# Patient Record
Sex: FEMALE | Race: BLACK OR AFRICAN AMERICAN | NOT HISPANIC OR LATINO | Employment: UNEMPLOYED | URBAN - METROPOLITAN AREA
[De-identification: names, ages, dates, MRNs, and addresses within clinical notes are randomized per-mention and may not be internally consistent; named-entity substitution may affect disease eponyms.]

---

## 2018-03-02 ENCOUNTER — TELEPHONE (OUTPATIENT)
Dept: OBSTETRICS AND GYNECOLOGY | Facility: CLINIC | Age: 38
End: 2018-03-02

## 2018-03-02 NOTE — TELEPHONE ENCOUNTER
Jes-    Can you ask her to come in Friday the 9th @ 9:45.  Im going to put her in so no one takes the spot.    Cherelle

## 2018-03-02 NOTE — TELEPHONE ENCOUNTER
Cherelle,     I am fine to see her and try.      Thanks   Dr. Hawkins    ----- Message from Jes Summers sent at 3/1/2018  3:03 PM EST -----  Contact: Doctor's office  Dr. Shah's office called to see if we could work this patient in sometime soon because she has an IUD that he could not remove. He doesn't operate anymore and he thinks she'll need D&C. I have her records being sent over. Are you able to see her and evaluate this? They requested sometime within the next two weeks?

## 2018-03-09 ENCOUNTER — OFFICE VISIT (OUTPATIENT)
Dept: OBSTETRICS AND GYNECOLOGY | Facility: CLINIC | Age: 38
End: 2018-03-09

## 2018-03-09 VITALS
DIASTOLIC BLOOD PRESSURE: 82 MMHG | HEART RATE: 68 BPM | BODY MASS INDEX: 37.36 KG/M2 | HEIGHT: 62 IN | SYSTOLIC BLOOD PRESSURE: 125 MMHG | WEIGHT: 203 LBS

## 2018-03-09 DIAGNOSIS — R10.2 PELVIC PAIN: ICD-10-CM

## 2018-03-09 DIAGNOSIS — T83.32XA DISPLACEMENT OF INTRAUTERINE CONTRACEPTIVE DEVICE, INITIAL ENCOUNTER: Primary | ICD-10-CM

## 2018-03-09 PROCEDURE — 99204 OFFICE O/P NEW MOD 45 MIN: CPT | Performed by: OBSTETRICS & GYNECOLOGY

## 2018-03-14 ENCOUNTER — APPOINTMENT (OUTPATIENT)
Dept: PREADMISSION TESTING | Facility: HOSPITAL | Age: 38
End: 2018-03-14

## 2018-03-14 VITALS
HEIGHT: 62 IN | RESPIRATION RATE: 18 BRPM | TEMPERATURE: 97.1 F | DIASTOLIC BLOOD PRESSURE: 78 MMHG | WEIGHT: 204 LBS | HEART RATE: 74 BPM | SYSTOLIC BLOOD PRESSURE: 113 MMHG | BODY MASS INDEX: 37.54 KG/M2 | OXYGEN SATURATION: 98 %

## 2018-03-14 LAB
DEPRECATED RDW RBC AUTO: 42.9 FL (ref 37–54)
ERYTHROCYTE [DISTWIDTH] IN BLOOD BY AUTOMATED COUNT: 12.4 % (ref 11.7–13)
HCG SERPL QL: NEGATIVE
HCT VFR BLD AUTO: 39.5 % (ref 35.6–45.5)
HGB BLD-MCNC: 12.3 G/DL (ref 11.9–15.5)
MCH RBC QN AUTO: 29.6 PG (ref 26.9–32)
MCHC RBC AUTO-ENTMCNC: 31.1 G/DL (ref 32.4–36.3)
MCV RBC AUTO: 95.2 FL (ref 80.5–98.2)
PLATELET # BLD AUTO: 420 10*3/MM3 (ref 140–500)
PMV BLD AUTO: 9.1 FL (ref 6–12)
RBC # BLD AUTO: 4.15 10*6/MM3 (ref 3.9–5.2)
WBC NRBC COR # BLD: 6.59 10*3/MM3 (ref 4.5–10.7)

## 2018-03-14 PROCEDURE — 36415 COLL VENOUS BLD VENIPUNCTURE: CPT

## 2018-03-14 PROCEDURE — 93010 ELECTROCARDIOGRAM REPORT: CPT | Performed by: INTERNAL MEDICINE

## 2018-03-14 PROCEDURE — 85027 COMPLETE CBC AUTOMATED: CPT | Performed by: OBSTETRICS & GYNECOLOGY

## 2018-03-14 PROCEDURE — 93005 ELECTROCARDIOGRAM TRACING: CPT

## 2018-03-14 PROCEDURE — 84703 CHORIONIC GONADOTROPIN ASSAY: CPT | Performed by: OBSTETRICS & GYNECOLOGY

## 2018-03-14 NOTE — H&P
Patient Care Team:  No Known Provider as PCP - General    Chief complaint bleeding and pain     Subjective     History of Present Illness     37 y.o. female sent to me by local gynecologist no longer operating. Presented wanting IUD removed due to pain and bleeding issues, attempted times two in office to remove, not successful and was painful. Presents wanting removed still, plan to try hysteroscopically     Review of Systems   Constitutional: Negative for chills and fever.   Gastrointestinal: Negative for abdominal pain, nausea and vomiting.   Genitourinary: Positive for pelvic pain and vaginal bleeding. Negative for vaginal discharge.   All other systems reviewed and are negative.       Past Medical History:   Diagnosis Date   • Migraine      Past Surgical History:   Procedure Laterality Date   • WISDOM TOOTH EXTRACTION       Family History   Problem Relation Age of Onset   • Breast cancer Neg Hx    • Ovarian cancer Neg Hx    • Uterine cancer Neg Hx    • Colon cancer Neg Hx    • Deep vein thrombosis Neg Hx    • Pulmonary embolism Neg Hx      Social History   Substance Use Topics   • Smoking status: Never Smoker   • Smokeless tobacco: Never Used   • Alcohol use Yes     No prescriptions prior to admission.     Allergies:  Review of patient's allergies indicates no known allergies.    Objective      Vital Signs       Physical Exam   Constitutional: She is oriented to person, place, and time. She appears well-developed and well-nourished.   HENT:   Head: Normocephalic and atraumatic.   Eyes: Conjunctivae are normal.   Neck: Normal range of motion. Neck supple. No thyromegaly present.   Cardiovascular: Normal rate, regular rhythm and normal heart sounds.    No murmur heard.  Pulmonary/Chest: Effort normal and breath sounds normal.   Abdominal: Soft. Bowel sounds are normal.   Musculoskeletal: She exhibits no edema.   Neurological: She is alert and oriented to person, place, and time.   Skin: No rash noted.    Psychiatric: She has a normal mood and affect. Her behavior is normal.       Results Review:   Labs pending       Assessment/Plan     Principal Problem:    Displacement of intrauterine contraceptive device      Assessment & Plan     1) IUD causing pain and bleeding   Appears to be difficult to remove by prior GYN   Plan to assess removal via HSC and if necessary Laparoscopy. - discussed potential to not be able to remove intact.     R/B/A reviewed.   Voiced understanding   Desires to proceed.     I discussed the patients findings and my recommendations with patient    Iglesia Hawkins MD  03/14/18  12:53 PM

## 2018-03-14 NOTE — DISCHARGE INSTRUCTIONS
Take the following medications the morning of surgery with a small sip of water: NONE    ARRIVE AT 0530 AM    General Instructions:  • Do not eat solid food after midnight the night before surgery.  • You may drink clear liquids day of surgery but must stop at least one hour before your hospital arrival time.  • It is beneficial for you to have a clear drink that contains carbohydrates the day of surgery.  We suggest a 12 to 20 ounce bottle of Gatorade or Powerade for non-diabetic patients or a 12 to 20 ounce bottle of G2 or Powerade Zero for diabetic patients. (Pediatric patients, are not advised to drink a 12 to 20 ounce carbohydrate drink)    Clear liquids are liquids you can see through.  Nothing red in color.     Plain water                               Sports drinks  Sodas                                   Gelatin (Jell-O)  Fruit juices without pulp such as white grape juice and apple juice  Popsicles that contain no fruit or yogurt  Tea or coffee (no cream or milk added)  Gatorade / Powerade  G2 / Powerade Zero    • Infants may have breast milk up to four hours before surgery.  • Infants drinking formula may drink formula up to six hours before surgery.   • Patients who avoid smoking, chewing tobacco and alcohol for 4 weeks prior to surgery have a reduced risk of post-operative complications.  Quit smoking as many days before surgery as you can.  • Do not smoke, use chewing tobacco or drink alcohol the day of surgery.   • If applicable bring your C-PAP/ BI-PAP machine.  • Bring any papers given to you in the doctor’s office.  • Wear clean comfortable clothes and socks.  • Do not wear contact lenses or make-up.  Bring a case for your glasses.   • Bring crutches or walker if applicable.  • Remove all piercings.  Leave jewelry and any other valuables at home.  • Hair extensions with metal clips must be removed prior to surgery.  • The Pre-Admission Testing nurse will instruct you to bring medications if unable  to obtain an accurate list in Pre-Admission Testing.        Preventing a Surgical Site Infection:  • For 2 to 3 days before surgery, avoid shaving with a razor because the razor can irritate skin and make it easier to develop an infection.  • The night prior to surgery sleep in a clean bed with clean clothing.  Do not allow pets to sleep with you.  • Shower on the morning of surgery using a fresh bar of anti-bacterial soap (such as Dial) and clean washcloth.  Dry with a clean towel and dress in clean clothing.  • Ask your surgeon if you will be receiving antibiotics prior to surgery.  • Make sure you, your family, and all healthcare providers clean their hands with soap and water or an alcohol based hand  before caring for you or your wound.    Day of surgery:  Upon arrival, a Pre-op nurse and Anesthesiologist will review your health history, obtain vital signs, and answer questions you may have.  The only belongings needed at this time will be your home medications and if applicable your C-PAP/BI-PAP machine.  If you are staying overnight your family can leave the rest of your belongings in the car and bring them to your room later.  A Pre-op nurse will start an IV and you may receive medication in preparation for surgery, including something to help you relax.  Your family will be able to see you in the Pre-op area.  While you are in surgery your family should notify the waiting room  if they leave the waiting room area and provide a contact phone number.    Please be aware that surgery does come with discomfort.  We want to make every effort to control your discomfort so please discuss any uncontrolled symptoms with your nurse.   Your doctor will most likely have prescribed pain medications.      If you are going home after surgery you will receive individualized written care instructions before being discharged.  A responsible adult must drive you to and from the hospital on the day of your  surgery and stay with you for 24 hours.    If you are staying overnight following surgery, you will be transported to your hospital room following the recovery period.  [facility] has all private rooms.    If you have any questions please call Pre-Admission Testing at [phone].  Deductibles and co-payments are collected on the day of service. Please be prepared to pay the required co-pay, deductible or deposit on the day of service as defined by your plan.

## 2018-03-15 ENCOUNTER — ANESTHESIA EVENT (OUTPATIENT)
Dept: PERIOP | Facility: HOSPITAL | Age: 38
End: 2018-03-15

## 2018-03-15 ENCOUNTER — HOSPITAL ENCOUNTER (OUTPATIENT)
Facility: HOSPITAL | Age: 38
Setting detail: HOSPITAL OUTPATIENT SURGERY
Discharge: HOME | End: 2018-03-15
Attending: OBSTETRICS & GYNECOLOGY | Admitting: OBSTETRICS & GYNECOLOGY

## 2018-03-15 ENCOUNTER — ANESTHESIA (OUTPATIENT)
Dept: PERIOP | Facility: HOSPITAL | Age: 38
End: 2018-03-15

## 2018-03-15 VITALS
WEIGHT: 205.03 LBS | SYSTOLIC BLOOD PRESSURE: 115 MMHG | TEMPERATURE: 98 F | BODY MASS INDEX: 37.73 KG/M2 | OXYGEN SATURATION: 100 % | DIASTOLIC BLOOD PRESSURE: 68 MMHG | HEIGHT: 62 IN | RESPIRATION RATE: 16 BRPM | HEART RATE: 81 BPM

## 2018-03-15 DIAGNOSIS — T83.32XA DISPLACEMENT OF INTRAUTERINE CONTRACEPTIVE DEVICE, INITIAL ENCOUNTER: Primary | ICD-10-CM

## 2018-03-15 PROCEDURE — 25010000002 FENTANYL CITRATE (PF) 100 MCG/2ML SOLUTION: Performed by: NURSE ANESTHETIST, CERTIFIED REGISTERED

## 2018-03-15 PROCEDURE — 25010000002 ONDANSETRON PER 1 MG: Performed by: NURSE ANESTHETIST, CERTIFIED REGISTERED

## 2018-03-15 PROCEDURE — 25010000002 DEXAMETHASONE PER 1 MG: Performed by: NURSE ANESTHETIST, CERTIFIED REGISTERED

## 2018-03-15 PROCEDURE — 25010000002 SUCCINYLCHOLINE PER 20 MG: Performed by: NURSE ANESTHETIST, CERTIFIED REGISTERED

## 2018-03-15 PROCEDURE — 25010000002 PROPOFOL 10 MG/ML EMULSION: Performed by: NURSE ANESTHETIST, CERTIFIED REGISTERED

## 2018-03-15 PROCEDURE — 58562 HYSTEROSCOPY REMOVE FB: CPT | Performed by: OBSTETRICS & GYNECOLOGY

## 2018-03-15 PROCEDURE — 25010000002 MIDAZOLAM PER 1 MG: Performed by: ANESTHESIOLOGY

## 2018-03-15 RX ORDER — PROMETHAZINE HYDROCHLORIDE 25 MG/1
25 TABLET ORAL ONCE AS NEEDED
Status: DISCONTINUED | OUTPATIENT
Start: 2018-03-15 | End: 2018-03-15 | Stop reason: HOSPADM

## 2018-03-15 RX ORDER — MIDAZOLAM HYDROCHLORIDE 1 MG/ML
1 INJECTION INTRAMUSCULAR; INTRAVENOUS
Status: DISCONTINUED | OUTPATIENT
Start: 2018-03-15 | End: 2018-03-15 | Stop reason: HOSPADM

## 2018-03-15 RX ORDER — HYDROMORPHONE HCL 110MG/55ML
0.5 PATIENT CONTROLLED ANALGESIA SYRINGE INTRAVENOUS
Status: DISCONTINUED | OUTPATIENT
Start: 2018-03-15 | End: 2018-03-15 | Stop reason: HOSPADM

## 2018-03-15 RX ORDER — DIPHENHYDRAMINE HYDROCHLORIDE 50 MG/ML
12.5 INJECTION INTRAMUSCULAR; INTRAVENOUS
Status: DISCONTINUED | OUTPATIENT
Start: 2018-03-15 | End: 2018-03-15 | Stop reason: HOSPADM

## 2018-03-15 RX ORDER — LIDOCAINE HYDROCHLORIDE 20 MG/ML
INJECTION, SOLUTION INFILTRATION; PERINEURAL AS NEEDED
Status: DISCONTINUED | OUTPATIENT
Start: 2018-03-15 | End: 2018-03-15 | Stop reason: SURG

## 2018-03-15 RX ORDER — FLUMAZENIL 0.1 MG/ML
0.2 INJECTION INTRAVENOUS AS NEEDED
Status: DISCONTINUED | OUTPATIENT
Start: 2018-03-15 | End: 2018-03-15 | Stop reason: HOSPADM

## 2018-03-15 RX ORDER — FENTANYL CITRATE 50 UG/ML
50 INJECTION, SOLUTION INTRAMUSCULAR; INTRAVENOUS
Status: DISCONTINUED | OUTPATIENT
Start: 2018-03-15 | End: 2018-03-15 | Stop reason: HOSPADM

## 2018-03-15 RX ORDER — SODIUM CHLORIDE 0.9 % (FLUSH) 0.9 %
1-10 SYRINGE (ML) INJECTION AS NEEDED
Status: DISCONTINUED | OUTPATIENT
Start: 2018-03-15 | End: 2018-03-15 | Stop reason: HOSPADM

## 2018-03-15 RX ORDER — LIDOCAINE HYDROCHLORIDE 10 MG/ML
0.5 INJECTION, SOLUTION EPIDURAL; INFILTRATION; INTRACAUDAL; PERINEURAL ONCE AS NEEDED
Status: DISCONTINUED | OUTPATIENT
Start: 2018-03-15 | End: 2018-03-15 | Stop reason: HOSPADM

## 2018-03-15 RX ORDER — LABETALOL HYDROCHLORIDE 5 MG/ML
5 INJECTION, SOLUTION INTRAVENOUS
Status: DISCONTINUED | OUTPATIENT
Start: 2018-03-15 | End: 2018-03-15 | Stop reason: HOSPADM

## 2018-03-15 RX ORDER — HYDRALAZINE HYDROCHLORIDE 20 MG/ML
5 INJECTION INTRAMUSCULAR; INTRAVENOUS
Status: DISCONTINUED | OUTPATIENT
Start: 2018-03-15 | End: 2018-03-15 | Stop reason: HOSPADM

## 2018-03-15 RX ORDER — HYDROCODONE BITARTRATE AND ACETAMINOPHEN 5; 325 MG/1; MG/1
1 TABLET ORAL EVERY 6 HOURS PRN
Qty: 10 TABLET | Refills: 0 | Status: SHIPPED | OUTPATIENT
Start: 2018-03-15 | End: 2018-03-29

## 2018-03-15 RX ORDER — DEXAMETHASONE SODIUM PHOSPHATE 10 MG/ML
INJECTION INTRAMUSCULAR; INTRAVENOUS AS NEEDED
Status: DISCONTINUED | OUTPATIENT
Start: 2018-03-15 | End: 2018-03-15 | Stop reason: SURG

## 2018-03-15 RX ORDER — EPHEDRINE SULFATE 50 MG/ML
5 INJECTION, SOLUTION INTRAVENOUS ONCE AS NEEDED
Status: DISCONTINUED | OUTPATIENT
Start: 2018-03-15 | End: 2018-03-15 | Stop reason: HOSPADM

## 2018-03-15 RX ORDER — MAGNESIUM HYDROXIDE 1200 MG/15ML
LIQUID ORAL AS NEEDED
Status: DISCONTINUED | OUTPATIENT
Start: 2018-03-15 | End: 2018-03-15 | Stop reason: HOSPADM

## 2018-03-15 RX ORDER — PROMETHAZINE HYDROCHLORIDE 25 MG/1
25 SUPPOSITORY RECTAL ONCE AS NEEDED
Status: DISCONTINUED | OUTPATIENT
Start: 2018-03-15 | End: 2018-03-15 | Stop reason: HOSPADM

## 2018-03-15 RX ORDER — SCOLOPAMINE TRANSDERMAL SYSTEM 1 MG/1
1 PATCH, EXTENDED RELEASE TRANSDERMAL
Status: DISCONTINUED | OUTPATIENT
Start: 2018-03-15 | End: 2018-03-15 | Stop reason: HOSPADM

## 2018-03-15 RX ORDER — FENTANYL CITRATE 50 UG/ML
INJECTION, SOLUTION INTRAMUSCULAR; INTRAVENOUS AS NEEDED
Status: DISCONTINUED | OUTPATIENT
Start: 2018-03-15 | End: 2018-03-15 | Stop reason: SURG

## 2018-03-15 RX ORDER — ROCURONIUM BROMIDE 10 MG/ML
INJECTION, SOLUTION INTRAVENOUS AS NEEDED
Status: DISCONTINUED | OUTPATIENT
Start: 2018-03-15 | End: 2018-03-15 | Stop reason: SURG

## 2018-03-15 RX ORDER — PROPOFOL 10 MG/ML
VIAL (ML) INTRAVENOUS AS NEEDED
Status: DISCONTINUED | OUTPATIENT
Start: 2018-03-15 | End: 2018-03-15 | Stop reason: SURG

## 2018-03-15 RX ORDER — ONDANSETRON 2 MG/ML
4 INJECTION INTRAMUSCULAR; INTRAVENOUS ONCE AS NEEDED
Status: DISCONTINUED | OUTPATIENT
Start: 2018-03-15 | End: 2018-03-15 | Stop reason: HOSPADM

## 2018-03-15 RX ORDER — ONDANSETRON 2 MG/ML
INJECTION INTRAMUSCULAR; INTRAVENOUS AS NEEDED
Status: DISCONTINUED | OUTPATIENT
Start: 2018-03-15 | End: 2018-03-15 | Stop reason: SURG

## 2018-03-15 RX ORDER — SODIUM CHLORIDE, SODIUM LACTATE, POTASSIUM CHLORIDE, CALCIUM CHLORIDE 600; 310; 30; 20 MG/100ML; MG/100ML; MG/100ML; MG/100ML
9 INJECTION, SOLUTION INTRAVENOUS CONTINUOUS
Status: DISCONTINUED | OUTPATIENT
Start: 2018-03-15 | End: 2018-03-15 | Stop reason: HOSPADM

## 2018-03-15 RX ORDER — FAMOTIDINE 10 MG/ML
20 INJECTION, SOLUTION INTRAVENOUS ONCE
Status: COMPLETED | OUTPATIENT
Start: 2018-03-15 | End: 2018-03-15

## 2018-03-15 RX ORDER — PROMETHAZINE HYDROCHLORIDE 25 MG/1
12.5 TABLET ORAL ONCE AS NEEDED
Status: DISCONTINUED | OUTPATIENT
Start: 2018-03-15 | End: 2018-03-15 | Stop reason: HOSPADM

## 2018-03-15 RX ORDER — HYDROCODONE BITARTRATE AND ACETAMINOPHEN 7.5; 325 MG/1; MG/1
1 TABLET ORAL ONCE AS NEEDED
Status: DISCONTINUED | OUTPATIENT
Start: 2018-03-15 | End: 2018-03-15 | Stop reason: HOSPADM

## 2018-03-15 RX ORDER — OXYCODONE AND ACETAMINOPHEN 7.5; 325 MG/1; MG/1
1 TABLET ORAL ONCE AS NEEDED
Status: DISCONTINUED | OUTPATIENT
Start: 2018-03-15 | End: 2018-03-15 | Stop reason: HOSPADM

## 2018-03-15 RX ORDER — NALOXONE HCL 0.4 MG/ML
0.2 VIAL (ML) INJECTION AS NEEDED
Status: DISCONTINUED | OUTPATIENT
Start: 2018-03-15 | End: 2018-03-15 | Stop reason: HOSPADM

## 2018-03-15 RX ORDER — PROMETHAZINE HYDROCHLORIDE 25 MG/ML
12.5 INJECTION, SOLUTION INTRAMUSCULAR; INTRAVENOUS ONCE AS NEEDED
Status: DISCONTINUED | OUTPATIENT
Start: 2018-03-15 | End: 2018-03-15 | Stop reason: HOSPADM

## 2018-03-15 RX ORDER — MIDAZOLAM HYDROCHLORIDE 1 MG/ML
2 INJECTION INTRAMUSCULAR; INTRAVENOUS
Status: DISCONTINUED | OUTPATIENT
Start: 2018-03-15 | End: 2018-03-15 | Stop reason: HOSPADM

## 2018-03-15 RX ORDER — SUCCINYLCHOLINE CHLORIDE 20 MG/ML
INJECTION INTRAMUSCULAR; INTRAVENOUS AS NEEDED
Status: DISCONTINUED | OUTPATIENT
Start: 2018-03-15 | End: 2018-03-15 | Stop reason: SURG

## 2018-03-15 RX ADMIN — SUCCINYLCHOLINE CHLORIDE 120 MG: 20 INJECTION, SOLUTION INTRAMUSCULAR; INTRAVENOUS; PARENTERAL at 07:36

## 2018-03-15 RX ADMIN — DEXAMETHASONE SODIUM PHOSPHATE 8 MG: 10 INJECTION INTRAMUSCULAR; INTRAVENOUS at 07:43

## 2018-03-15 RX ADMIN — PROPOFOL 200 MG: 10 INJECTION, EMULSION INTRAVENOUS at 07:35

## 2018-03-15 RX ADMIN — MIDAZOLAM 1 MG: 1 INJECTION INTRAMUSCULAR; INTRAVENOUS at 06:45

## 2018-03-15 RX ADMIN — SCOPALAMINE 1 PATCH: 1 PATCH, EXTENDED RELEASE TRANSDERMAL at 06:45

## 2018-03-15 RX ADMIN — SODIUM CHLORIDE, POTASSIUM CHLORIDE, SODIUM LACTATE AND CALCIUM CHLORIDE: 600; 310; 30; 20 INJECTION, SOLUTION INTRAVENOUS at 07:29

## 2018-03-15 RX ADMIN — ROCURONIUM BROMIDE 5 MG: 10 INJECTION INTRAVENOUS at 07:35

## 2018-03-15 RX ADMIN — ONDANSETRON 4 MG: 2 INJECTION INTRAMUSCULAR; INTRAVENOUS at 07:43

## 2018-03-15 RX ADMIN — FENTANYL CITRATE 100 MCG: 50 INJECTION INTRAMUSCULAR; INTRAVENOUS at 07:32

## 2018-03-15 RX ADMIN — LIDOCAINE HYDROCHLORIDE 100 MG: 20 INJECTION, SOLUTION INFILTRATION; PERINEURAL at 07:35

## 2018-03-15 RX ADMIN — FAMOTIDINE 20 MG: 10 INJECTION INTRAVENOUS at 06:45

## 2018-03-15 NOTE — OP NOTE
OP Note    Hysteroscopic Removal of IUD      Date of Service:  03/15/18  Time of Service:  8:18 AM    Surgical Staff: Surgeon(s) and Role:     * Iglesia Hawkins MD - Primary   Additional Staff: None      Pre-operative diagnosis(es):   Pre-Op Diagnosis Codes:     * Displacement of intrauterine contraceptive device, initial encounter [T83.32XA]     Post-operative diagnosis(es):   Post-Op Diagnosis Codes:     * Displacement of intrauterine contraceptive device, initial encounter [T83.32XA]   Procedure(s): Procedure(s):  Hysteroscopy with removal of IUD           Anesthesia: Type: General  ASA:  II          Operative findings: IUD in upper aspect of the cervix, lower uterine segment.    Specimens removed: None               Drains:  None       Complications: None       Condition: stable   Disposition: to PACU and then home            Patient was taken to operating room.  She was placed under general anesthesia with LMA and placed in the dorsal supine lithotomy position in [facility].  Time out was performed and she was prepped and draped in normal sterile fashion with in and out catheterization of her bladder.     I then placed a weighted speculum in her vagina and grasped the anterior cervix. Using the graduated dilators, I opened the cervix to 16 Romansh and then placed the hysteroscope into the cavity.  I was able quickly to identify her IUD and it appeared intact and low in the uterus/upper cervix.  Trial to blindly grab with uterine packing forceps was unsuccessful. So I placed a grasper through the hysteroscope and was able to bring the top of the IUD out and then remove without trouble.      The IUD was inspected and intact minus the string, which had been reported by prior GYN as broken.      The tenaculum was removed and significant bleeding noted along the anterior cervix.  To get hemostasis, I placed two 3-0 chromic figure of eight sutures and then coated that with Monsel's.      Hemostasis was noted  at that point.     The patient tolerated the procedure well, is on her way to the recovery room and will make be discharged later today with follow up in my office.              Iglesia Hawkins MD  03/15/18  8:17 AM

## 2018-03-15 NOTE — ANESTHESIA POSTPROCEDURE EVALUATION
Patient: April DARIEN Gooden    Procedure Summary     Date:  03/15/18 Room / Location:  [facility] OR  / [facility] MAIN OR    Anesthesia Start:  0729 Anesthesia Stop:  0826    Procedure:  Hysteroscopy with removal of IUD (N/A Uterus) Diagnosis:       Displacement of intrauterine contraceptive device, initial encounter      (Displacement of intrauterine contraceptive device, initial encounter [T83.32XA])    Surgeon:  Iglesia Hawkins MD Provider:  Omega Bernard MD    Anesthesia Type:  general ASA Status:  2          Anesthesia Type: general  Last vitals  BP   129/89 (03/15/18 0845)   Temp   36.8 °C (98.2 °F) (03/15/18 0822)   Pulse   63 (03/15/18 0845)   Resp   16 (03/15/18 0845)     SpO2   100 % (03/15/18 0845)     Post Anesthesia Care and Evaluation    Patient location during evaluation: PACU  Patient participation: complete - patient participated  Level of consciousness: awake and alert  Pain management: adequate  Airway patency: patent  Anesthetic complications: No anesthetic complications    Cardiovascular status: acceptable  Respiratory status: acceptable  Hydration status: acceptable    Comments: --------------------            03/15/18               0845     --------------------   BP:       129/89     Pulse:      63       Resp:       16       Temp:                SpO2:      100%     --------------------

## 2018-03-15 NOTE — ANESTHESIA PREPROCEDURE EVALUATION
Anesthesia Evaluation     Patient summary reviewed and Nursing notes reviewed                Airway   Mallampati: II  No difficulty expected  Dental      Pulmonary - negative pulmonary ROS   Cardiovascular - negative cardio ROS    ECG reviewed  Rhythm: regular  Rate: normal        Neuro/Psych  (+) headaches, psychiatric history Anxiety and Depression,     GI/Hepatic/Renal/Endo - negative ROS     Musculoskeletal (-) negative ROS    Abdominal    Substance History - negative use     OB/GYN negative ob/gyn ROS         Other                        Anesthesia Plan    ASA 2     general     intravenous induction   Anesthetic plan and risks discussed with patient.

## 2018-03-15 NOTE — INTERVAL H&P NOTE
H&P reviewed. The patient was examined and there are no changes to the H&P.     Iglesia Hawkins MD  3/15/2018  7:24 AM

## 2018-03-15 NOTE — DISCHARGE INSTRUCTIONS
**Pelvic rest for 2 weeks**     HOW DO I REST MY PELVIS?  For as long as told by your health care provider:  · Do not have sex, sexual stimulation, or an orgasm.  · Do not use tampons. Do not douche. Do not put anything in your vagina.  · Avoid activities that take a lot of effort (are strenuous).  · Avoid any activity in which your pelvic muscles could become strained.      Scopolamine Patch  This patch has been applied to the skin behind one of your ears.  It may stay in place up to 24 hours. You may remove it at any time after your surgery; however, it should be removed after you are up and walking around the next day.  This medicine reduces stomach upset. Side effects may include: dry mouth, dizziness, sleepiness, constipation, or upset stomach.  An allergy would show up as: a rash, itching, wheezing or shortness of breath.  Follow these instructions:  1. Do not drink alcohol, drive or operate machinery while taking this medicine.  2. Wear only 1 patch at a time. You can leave the patch on for up to 24 hours.  3. When you remove the patch, fold it in half with the sticky sides together and throw it away. Wash your hands and the area under the patch.  4. Do not touch your eye with your hand if it has touched the patch.  5. Wash your hands well before and after touching the patch.  6. Sit or stand slowly to avoid dizziness.  Call your doctor if you have:  1. Any sign of allergy  2. No relief  3. Trouble passing urine  4. Any new or severe symptoms      **Please remove the patch behind your ear tonight or tomorrow.**

## 2018-03-15 NOTE — ANESTHESIA PROCEDURE NOTES
Airway  Urgency: elective    Date/Time: 3/15/2018 7:38 AM  Airway not difficult    General Information and Staff    Patient location during procedure: OR  Anesthesiologist: RONALD SHRESTHA  CRNA: AIDA TRIMBLE    Indications and Patient Condition  Indications for airway management: airway protection    Preoxygenated: yes  MILS maintained throughout  Mask difficulty assessment: 1 - vent by mask    Final Airway Details  Final airway type: endotracheal airway      Successful airway: ETT  Cuffed: yes   Successful intubation technique: direct laryngoscopy  Facilitating devices/methods: intubating stylet  Endotracheal tube insertion site: oral  Blade: Stormy  Blade size: #3  ETT size: 7.0 mm  Cormack-Lehane Classification: grade I - full view of glottis  Placement verified by: chest auscultation and capnometry   Measured from: lips  ETT to lips (cm): 22  Number of attempts at approach: 1    Additional Comments  Teeth checked, no damage

## 2018-03-29 ENCOUNTER — OFFICE VISIT (OUTPATIENT)
Dept: OBSTETRICS AND GYNECOLOGY | Facility: CLINIC | Age: 38
End: 2018-03-29

## 2018-03-29 VITALS
SYSTOLIC BLOOD PRESSURE: 132 MMHG | BODY MASS INDEX: 37.91 KG/M2 | HEIGHT: 62 IN | WEIGHT: 206 LBS | DIASTOLIC BLOOD PRESSURE: 84 MMHG | HEART RATE: 79 BPM

## 2018-03-29 DIAGNOSIS — Z98.890 POST-OPERATIVE STATE: Primary | ICD-10-CM

## 2018-03-29 PROCEDURE — 99212 OFFICE O/P EST SF 10 MIN: CPT | Performed by: OBSTETRICS & GYNECOLOGY

## 2018-03-29 NOTE — PROGRESS NOTES
"Subjective   April DARIEN Gooden is a 37 y.o. female here for Post op exam. Pt is s/p IUD removal on 03/15/2018. Pt without c/o's.    History of Present Illness     37 y.o.  @ 1 week post IUD removal.   No issues or concerns.     Review of Systems   Constitutional: Negative for chills and fever.   Gastrointestinal: Negative for abdominal pain, nausea and vomiting.   Genitourinary: Negative for pelvic pain, vaginal bleeding and vaginal discharge.       Objective    /84   Pulse 79   Ht 157.5 cm (62\")   Wt 93.4 kg (206 lb)   LMP 2018   Breastfeeding? No   BMI 37.68 kg/m²   Physical Exam   Constitutional: She appears well-developed and well-nourished. No distress.   Abdominal: Soft. Bowel sounds are normal. She exhibits no distension. There is no tenderness.   Genitourinary: Vagina normal and uterus normal. Pelvic exam was performed with patient supine. There is no lesion on the right labia. There is no lesion on the left labia. Uterus is not enlarged, not fixed and not tender. Cervix exhibits no motion tenderness. Right adnexum displays no mass and no tenderness. Left adnexum displays no mass and no tenderness. No bleeding in the vagina. No vaginal discharge found.   Lymphadenopathy:        Right: No inguinal adenopathy present.         Assessment/Plan   Problems Addressed this Visit     None      Visit Diagnoses     Post-operative state    -  Primary        S/P HSC removal of IUD   No apparent complications   Reviewed operative findings and ongoing expectations.   Follow up Dr. Shah as needed.     Iglesia Hawkins MD  3/29/2018  2:52 PM               "

## 2019-05-19 NOTE — PROGRESS NOTES
"Subjective   April Giacomo is a 37 y.o. female here for Mirena removal. Pt has had cramping for the past few months and desires to have this removed. Pt has been to her GYN, but he was unable to remove the IUD after 2 attempts.     History of Present Illness     37 y.o. - Seeing her normal GYN, Dr. Shah for IUD to be removed, was having trouble with pain this time. Was removing for pain, placed 3 years ago, approximately, pain worse since October, 2017.   Tried times two, once pulling out the strings and not able to remove.  Here to discuss trial of Hysteroscopic removal.   Her GYN no longer does operative cases and she is new to this practice/[facility].     Past Medical History:   Diagnosis Date   • Migraine      Past Surgical History:   Procedure Laterality Date   • WISDOM TOOTH EXTRACTION       Meds - Multivitamin     No Known Allergies    Social History   Substance Use Topics   • Smoking status: Never Smoker   • Smokeless tobacco: Never Used   • Alcohol use Yes     Family History   Problem Relation Age of Onset   • Breast cancer Neg Hx    • Ovarian cancer Neg Hx    • Uterine cancer Neg Hx    • Colon cancer Neg Hx    • Deep vein thrombosis Neg Hx    • Pulmonary embolism Neg Hx        Review of Systems   Constitutional: Negative for chills and fever.   Gastrointestinal: Negative for abdominal pain.   Genitourinary: Positive for pelvic pain. Negative for dysuria, menstrual problem, vaginal bleeding and vaginal discharge.   All other systems reviewed and are negative.      Objective    /82  Pulse 68  Ht 157.5 cm (62\")  Wt 92.1 kg (203 lb)  LMP 03/07/2018 (Exact Date)  Breastfeeding? No  BMI 37.13 kg/m2   Physical Exam   Constitutional: She is oriented to person, place, and time. She appears well-developed and well-nourished.   HENT:   Head: Normocephalic and atraumatic.   Eyes: Conjunctivae are normal.   Neck: Normal range of motion. Neck supple. No thyromegaly present.   Cardiovascular: Normal rate, " regular rhythm and normal heart sounds.    No murmur heard.  Pulmonary/Chest: Effort normal and breath sounds normal.   Abdominal: Soft. Bowel sounds are normal.   Genitourinary: Uterus normal. Pelvic exam was performed with patient supine. There is no lesion on the right labia. There is no lesion on the left labia. Uterus is not enlarged, not fixed and not tender. Cervix exhibits no motion tenderness (No strings seen ). Right adnexum displays no mass. Left adnexum displays no mass. There is bleeding in the vagina. No vaginal discharge found.   Musculoskeletal: She exhibits no edema.   Lymphadenopathy:        Right: No inguinal adenopathy present.        Left: No inguinal adenopathy present.   Neurological: She is alert and oriented to person, place, and time.   Skin: No rash noted.   Psychiatric: She has a normal mood and affect. Her behavior is normal.         Assessment/Plan   Problems Addressed this Visit     None      Visit Diagnoses     Displacement of intrauterine contraceptive device, initial encounter    -  Primary    Relevant Orders    Case Request    Pelvic pain            Has had attempted removal in office times two.   One of those causing her strings to come off.   Here to discuss options    - Given prior difficulty, plan to attempt removal with hysteroscopy, if concerned for perforation and in cavity, may require laparoscopy, but I doubt it.     R/B/A reviewed  Voiced understanding   Wishes to proceed as planned.     Iglesia Hawkins MD  3/9/2018  10:43 AM            no indicators present

## 2019-09-03 ENCOUNTER — PROCEDURE VISIT (OUTPATIENT)
Dept: OBSTETRICS AND GYNECOLOGY | Age: 39
End: 2019-09-03

## 2019-09-03 ENCOUNTER — OFFICE VISIT (OUTPATIENT)
Dept: OBSTETRICS AND GYNECOLOGY | Age: 39
End: 2019-09-03

## 2019-09-03 VITALS
WEIGHT: 205 LBS | BODY MASS INDEX: 37.73 KG/M2 | DIASTOLIC BLOOD PRESSURE: 80 MMHG | HEIGHT: 62 IN | SYSTOLIC BLOOD PRESSURE: 118 MMHG

## 2019-09-03 DIAGNOSIS — O36.80X0 ENCOUNTER TO DETERMINE FETAL VIABILITY OF PREGNANCY, SINGLE OR UNSPECIFIED FETUS: Primary | ICD-10-CM

## 2019-09-03 DIAGNOSIS — D50.0 IRON DEFICIENCY ANEMIA DUE TO CHRONIC BLOOD LOSS: ICD-10-CM

## 2019-09-03 DIAGNOSIS — O02.1 MISSED ABORTION: ICD-10-CM

## 2019-09-03 DIAGNOSIS — Z00.00 ENCOUNTER FOR MEDICAL EXAMINATION TO ESTABLISH CARE: Primary | ICD-10-CM

## 2019-09-03 DIAGNOSIS — Z79.01 ANTICOAGULATED: ICD-10-CM

## 2019-09-03 PROBLEM — I82.4Z2 ACUTE VENOUS EMBOLISM AND THROMBOSIS OF DEEP VESSELS OF DISTAL END OF LEFT LOWER EXTREMITY (HCC): Status: ACTIVE | Noted: 2019-06-26

## 2019-09-03 PROCEDURE — 99214 OFFICE O/P EST MOD 30 MIN: CPT | Performed by: OBSTETRICS & GYNECOLOGY

## 2019-09-03 PROCEDURE — 76817 TRANSVAGINAL US OBSTETRIC: CPT | Performed by: OBSTETRICS & GYNECOLOGY

## 2019-09-03 RX ORDER — MISOPROSTOL 200 UG/1
TABLET ORAL
Qty: 4 TABLET | Refills: 0 | Status: SHIPPED | OUTPATIENT
Start: 2019-09-03 | End: 2019-11-04

## 2019-09-03 RX ORDER — OXYCODONE HYDROCHLORIDE AND ACETAMINOPHEN 5; 325 MG/1; MG/1
1 TABLET ORAL EVERY 4 HOURS PRN
Qty: 10 TABLET | Refills: 0 | Status: SHIPPED | OUTPATIENT
Start: 2019-09-03 | End: 2019-09-19

## 2019-09-03 NOTE — PROGRESS NOTES
GYN Visit    9/3/2019    Patient: Le Gooden          MR#:[id number]      Chief Complaint   Patient presents with   • Establish Care     U/S with Emy (miscarriage ).  AMA  lmp 2019.  Recent DVT.  Displaced IUD removed with Dr. Hawkins in 2018.         History of Present Illness    39 y.o. female  who presents for pregnancy confirmatiojn  Doesn't feel pregnant and has had some bleeding and passed a few clots  No heavy bleeding and no cramping  Just got diagnosed with DVT in right foot, 4 months ago had a DVT in her left leg  US today shows a MAB- likely blighted ovum,   Pt doesn't want a D&C    A+ blood type  Usually anemic, last CBC in chart was 10 in May this year  New pt to me  See written gyn        Patient's last menstrual period was 2019.    ________________________________________  Patient Active Problem List   Diagnosis   • Displacement of intrauterine contraceptive device   • Acute venous embolism and thrombosis of deep vessels of distal end of left lower extremity (CMS/HCC)       Past Medical History:   Diagnosis Date   • Anemia    • Anxiety and depression    • Enlarged tonsils    • IUD migration (CMS/HCC)    • Migraine        Past Surgical History:   Procedure Laterality Date   • D&C HYSTEROSCOPY N/A 3/15/2018    Procedure: Hysteroscopy with removal of IUD;  Surgeon: Iglesia Hawkins MD;  Location: [facility];  Service: Obstetrics/Gynecology   • WISDOM TOOTH EXTRACTION         Social History     Tobacco Use   Smoking Status Never Smoker   Smokeless Tobacco Never Used       has a current medication list which includes the following prescription(s): multiple vitamin, rivaroxaban, misoprostol, and oxycodone-acetaminophen.  ________________________________________    Current contraception: none      The following portions of the patient's history were reviewed and updated as appropriate: allergies, current medications, past family history, past medical history, past social  "history, past surgical history and problem list.    Review of Systems   Constitutional: Positive for fatigue.   Gastrointestinal: Negative for nausea and vomiting.   Genitourinary: Positive for vaginal bleeding. Negative for pelvic pain.   Psychiatric/Behavioral: The patient is not nervous/anxious.    All other systems reviewed and are negative.      Pertinent items are noted in HPI.     Objective   Physical Exam    /80   Ht 157.5 cm (62\")   Wt 93 kg (205 lb)   LMP 06/22/2019   Breastfeeding? No   BMI 37.49 kg/m²    BP Readings from Last 3 Encounters:   09/03/19 118/80   03/29/18 132/84   03/15/18 115/68      Wt Readings from Last 3 Encounters:   09/03/19 93 kg (205 lb)   03/29/18 93.4 kg (206 lb)   03/15/18 93 kg (205 lb 0.4 oz)      BMI: Estimated body mass index is 37.49 kg/m² as calculated from the following:    Height as of this encounter: 157.5 cm (62\").    Weight as of this encounter: 93 kg (205 lb).    Lungs: non labored breathing, no wheezing or tachpnea  Extremities: extremities normal, atraumatic, no cyanosis or edema  Skin: Skin color, texture, turgor normal. No rashes or lesions  Neurologic: Grossly normal  General:   alert, appears stated age and cooperative   Abdomen: soft, non-tender, without masses or organomegaly       Vulva: normal   Vagina: normal mucosa   Cervix: no cervical motion tenderness and no lesions   Uterus: size consistent with 8 weeks   Adnexa: no mass, fullness, tenderness     Right leg with swelling and some redness in ankle    Assessment:      April was seen today for establish care.    Diagnoses and all orders for this visit:    Encounter for medical examination to establish care  -     Cancel: OB Panel With HIV  -     Cancel: TSH  -     Cancel: Hemoglobin A1c  -     Cancel: Urine Culture - Urine, Urine, Clean Catch  -     Cancel: Drug Profile Urine - 9 Drugs - Urine, Clean Catch  -     Cancel: Chlamydia / Gonococcus / Mycoplasma Genitalium, BRANDEN, Urine - Urine, Clean " Catch    Iron deficiency anemia due to chronic blood loss  -     CBC (No Diff)    Missed     Anticoagulated    Other orders  -     misoprostol (CYTOTEC) 200 MCG tablet; 2 tabs orally and 2 tabs vaginally  -     oxyCODONE-acetaminophen (PERCOCET) 5-325 MG per tablet; Take 1 tablet by mouth Every 4 (Four) Hours As Needed for Severe Pain .      See US- blighted ovum, empty sac, no pole or FHTs  Discussed D&C vs spontaneous AB  Complicated by being actively anticoagulated and possibly already anemic  Call out to her hematologist regarding managing the miscarriage.   I have called them twice with no response,  Tentative plan to hold xeralto evening prior to cytotec and not restart until after tissue is passed and bleeding has slowed.    Discussed with pt over the phone and cytotec sent to pharmacy as well as small amount percocet for severe cramping

## 2019-09-04 ENCOUNTER — HOSPITAL ENCOUNTER (EMERGENCY)
Facility: HOSPITAL | Age: 39
Discharge: HOME | End: 2019-09-04
Attending: EMERGENCY MEDICINE | Admitting: EMERGENCY MEDICINE

## 2019-09-04 ENCOUNTER — APPOINTMENT (OUTPATIENT)
Dept: ULTRASOUND IMAGING | Facility: HOSPITAL | Age: 39
End: 2019-09-04

## 2019-09-04 ENCOUNTER — TELEPHONE (OUTPATIENT)
Dept: OBSTETRICS AND GYNECOLOGY | Age: 39
End: 2019-09-04

## 2019-09-04 VITALS
HEART RATE: 90 BPM | TEMPERATURE: 98 F | DIASTOLIC BLOOD PRESSURE: 68 MMHG | HEIGHT: 66 IN | RESPIRATION RATE: 16 BRPM | WEIGHT: 195 LBS | OXYGEN SATURATION: 99 % | SYSTOLIC BLOOD PRESSURE: 108 MMHG | BODY MASS INDEX: 31.34 KG/M2

## 2019-09-04 DIAGNOSIS — O03.9 SPONTANEOUS MISCARRIAGE: Primary | ICD-10-CM

## 2019-09-04 LAB
ABO GROUP BLD: NORMAL
ALBUMIN SERPL-MCNC: 4.5 G/DL (ref 3.5–5.2)
ALBUMIN/GLOB SERPL: 1.4 G/DL
ALP SERPL-CCNC: 78 U/L (ref 39–117)
ALT SERPL W P-5'-P-CCNC: 51 U/L (ref 1–33)
ANION GAP SERPL CALCULATED.3IONS-SCNC: 14 MMOL/L (ref 5–15)
AST SERPL-CCNC: 23 U/L (ref 1–32)
BASOPHILS # BLD AUTO: 0.04 10*3/MM3 (ref 0–0.2)
BASOPHILS NFR BLD AUTO: 0.5 % (ref 0–1.5)
BILIRUB SERPL-MCNC: 0.3 MG/DL (ref 0.2–1.2)
BLD GP AB SCN SERPL QL: NEGATIVE
BUN BLD-MCNC: 10 MG/DL (ref 6–20)
BUN/CREAT SERPL: 15.4 (ref 7–25)
CALCIUM SPEC-SCNC: 9.3 MG/DL (ref 8.6–10.5)
CHLORIDE SERPL-SCNC: 104 MMOL/L (ref 98–107)
CO2 SERPL-SCNC: 22 MMOL/L (ref 22–29)
CREAT BLD-MCNC: 0.65 MG/DL (ref 0.57–1)
DEPRECATED RDW RBC AUTO: 43.5 FL (ref 37–54)
EOSINOPHIL # BLD AUTO: 0.12 10*3/MM3 (ref 0–0.4)
EOSINOPHIL NFR BLD AUTO: 1.4 % (ref 0.3–6.2)
ERYTHROCYTE [DISTWIDTH] IN BLOOD BY AUTOMATED COUNT: 13.2 % (ref 12.3–15.4)
ERYTHROCYTE [DISTWIDTH] IN BLOOD BY AUTOMATED COUNT: 13.5 % (ref 12.3–15.4)
GFR SERPL CREATININE-BSD FRML MDRD: 124 ML/MIN/1.73
GLOBULIN UR ELPH-MCNC: 3.3 GM/DL
GLUCOSE BLD-MCNC: 110 MG/DL (ref 65–99)
HCG INTACT+B SERPL-ACNC: 7565 MIU/ML
HCT VFR BLD AUTO: 36 % (ref 34–46.6)
HCT VFR BLD AUTO: 36.2 % (ref 34–46.6)
HGB BLD-MCNC: 11 G/DL (ref 12–15.9)
HGB BLD-MCNC: 11.2 G/DL (ref 12–15.9)
HOLD SPECIMEN: NORMAL
HOLD SPECIMEN: NORMAL
IMM GRANULOCYTES # BLD AUTO: 0.03 10*3/MM3 (ref 0–0.05)
IMM GRANULOCYTES NFR BLD AUTO: 0.3 % (ref 0–0.5)
LYMPHOCYTES # BLD AUTO: 2.15 10*3/MM3 (ref 0.7–3.1)
LYMPHOCYTES NFR BLD AUTO: 24.9 % (ref 19.6–45.3)
MCH RBC QN AUTO: 27.9 PG (ref 26.6–33)
MCH RBC QN AUTO: 28.6 PG (ref 26.6–33)
MCHC RBC AUTO-ENTMCNC: 30.4 G/DL (ref 31.5–35.7)
MCHC RBC AUTO-ENTMCNC: 31.1 G/DL (ref 31.5–35.7)
MCV RBC AUTO: 89.8 FL (ref 79–97)
MCV RBC AUTO: 94 FL (ref 79–97)
MONOCYTES # BLD AUTO: 0.54 10*3/MM3 (ref 0.1–0.9)
MONOCYTES NFR BLD AUTO: 6.3 % (ref 5–12)
NEUTROPHILS # BLD AUTO: 5.75 10*3/MM3 (ref 1.7–7)
NEUTROPHILS NFR BLD AUTO: 66.6 % (ref 42.7–76)
NRBC BLD AUTO-RTO: 0 /100 WBC (ref 0–0.2)
PLATELET # BLD AUTO: 398 10*3/MM3 (ref 140–450)
PLATELET # BLD AUTO: 462 10*3/MM3 (ref 140–450)
PMV BLD AUTO: 9 FL (ref 6–12)
POTASSIUM BLD-SCNC: 3.9 MMOL/L (ref 3.5–5.2)
PROT SERPL-MCNC: 7.8 G/DL (ref 6–8.5)
RBC # BLD AUTO: 3.85 10*6/MM3 (ref 3.77–5.28)
RBC # BLD AUTO: 4.01 10*6/MM3 (ref 3.77–5.28)
RH BLD: POSITIVE
SODIUM BLD-SCNC: 140 MMOL/L (ref 136–145)
T&S EXPIRATION DATE: NORMAL
WBC # BLD AUTO: 8.5 10*3/MM3 (ref 3.4–10.8)
WBC NRBC COR # BLD: 8.63 10*3/MM3 (ref 3.4–10.8)
WHOLE BLOOD HOLD SPECIMEN: NORMAL
WHOLE BLOOD HOLD SPECIMEN: NORMAL

## 2019-09-04 PROCEDURE — 85025 COMPLETE CBC W/AUTO DIFF WBC: CPT | Performed by: NURSE PRACTITIONER

## 2019-09-04 PROCEDURE — 84702 CHORIONIC GONADOTROPIN TEST: CPT | Performed by: NURSE PRACTITIONER

## 2019-09-04 PROCEDURE — 86850 RBC ANTIBODY SCREEN: CPT | Performed by: NURSE PRACTITIONER

## 2019-09-04 PROCEDURE — 76815 OB US LIMITED FETUS(S): CPT

## 2019-09-04 PROCEDURE — 76817 TRANSVAGINAL US OBSTETRIC: CPT

## 2019-09-04 PROCEDURE — 96360 HYDRATION IV INFUSION INIT: CPT

## 2019-09-04 PROCEDURE — 99284 EMERGENCY DEPT VISIT MOD MDM: CPT

## 2019-09-04 PROCEDURE — 86901 BLOOD TYPING SEROLOGIC RH(D): CPT | Performed by: NURSE PRACTITIONER

## 2019-09-04 PROCEDURE — 86900 BLOOD TYPING SEROLOGIC ABO: CPT | Performed by: NURSE PRACTITIONER

## 2019-09-04 PROCEDURE — 80053 COMPREHEN METABOLIC PANEL: CPT | Performed by: NURSE PRACTITIONER

## 2019-09-04 RX ADMIN — SODIUM CHLORIDE 1000 ML: 9 INJECTION, SOLUTION INTRAVENOUS at 14:18

## 2019-09-04 NOTE — ED PROVIDER NOTES
EMERGENCY DEPARTMENT ENCOUNTER    CHIEF COMPLAINT  Chief Complaint: vaginal bleeding  History given by:patient  History limited by:none  Time Seen:  1409  Room Number:   PMD: Provider, No Known      HPI:  Pt is a 39 y.o. female who presents () with worsened vaginal bleeding w/ recently dx blighted ovum by OBGYN. Vaginal bleeding began to worsen this morning and now is passing large amounts of blood and clots. Pt is currently on Xarelto for DVT she was dx w/ 19. She denies abd pain, fever, chills, light headedness, and other complaints. Pt was sent to the ED by her OBGYN for her sx.    PAST MEDICAL HISTORY  Active Ambulatory Problems     Diagnosis Date Noted   • Displacement of intrauterine contraceptive device 2018   • Acute venous embolism and thrombosis of deep vessels of distal end of left lower extremity (CMS/HCC) 2019     Resolved Ambulatory Problems     Diagnosis Date Noted   • No Resolved Ambulatory Problems     Past Medical History:   Diagnosis Date   • Anemia    • Anxiety and depression    • Enlarged tonsils    • IUD migration (CMS/HCC)    • Migraine        PAST SURGICAL HISTORY  Past Surgical History:   Procedure Laterality Date   • D&C HYSTEROSCOPY N/A 3/15/2018    Procedure: Hysteroscopy with removal of IUD;  Surgeon: Iglesia Hawkins MD;  Location: [facility];  Service: Obstetrics/Gynecology   • WISDOM TOOTH EXTRACTION         FAMILY HISTORY  Family History   Problem Relation Age of Onset   • No Known Problems Mother    • No Known Problems Father    • No Known Problems Sister    • No Known Problems Brother    • No Known Problems Maternal Grandmother    • No Known Problems Maternal Grandfather    • No Known Problems Paternal Grandmother    • No Known Problems Paternal Grandfather    • No Known Problems Son    • No Known Problems Daughter    • No Known Problems Son    • Breast cancer Neg Hx    • Ovarian cancer Neg Hx    • Uterine cancer Neg Hx    • Colon cancer Neg Hx     • Deep vein thrombosis Neg Hx    • Pulmonary embolism Neg Hx    • Malig Hyperthermia Neg Hx        SOCIAL HISTORY  Social History     Socioeconomic History   • Marital status: Single     Spouse name: Danis   • Number of children: 3   • Years of education: Not on file   • Highest education level: Not on file   Tobacco Use   • Smoking status: Never Smoker   • Smokeless tobacco: Never Used   Substance and Sexual Activity   • Alcohol use: Yes     Comment: OCCAS   • Drug use: No         ALLERGIES  Patient has no known allergies.    REVIEW OF SYSTEMS  Review of Systems   Constitutional: Negative.  Negative for activity change, appetite change ( decreased), chills, fatigue and fever.   HENT: Negative.  Negative for congestion, ear pain, rhinorrhea and sore throat.    Eyes: Negative.    Respiratory: Negative.  Negative for cough and shortness of breath.    Cardiovascular: Negative.  Negative for chest pain, palpitations and leg swelling ( pedal).   Gastrointestinal: Negative.  Negative for abdominal pain, constipation, diarrhea, nausea and vomiting.   Endocrine: Negative.    Genitourinary: Positive for vaginal bleeding. Negative for decreased urine volume, difficulty urinating, dysuria, menstrual problem, pelvic pain, urgency and vaginal discharge.   Musculoskeletal: Negative.  Negative for back pain.   Skin: Negative.  Negative for rash.   Allergic/Immunologic: Negative.    Neurological: Negative.  Negative for dizziness, weakness, light-headedness, numbness and headaches.   Hematological: Negative.    Psychiatric/Behavioral: Negative.  The patient is not nervous/anxious.    All other systems reviewed and are negative.      PHYSICAL EXAM  ED Triage Vitals [09/04/19 1401]   Temp Heart Rate Resp BP SpO2   98 °F (36.7 °C) 104 15 -- 98 %      Temp src Heart Rate Source Patient Position BP Location FiO2 (%)   Tympanic Monitor -- -- --       Physical Exam   Constitutional: She is well-developed, well-nourished, and in no  distress. No distress.   HENT:   Head: Normocephalic and atraumatic.   Mouth/Throat: Oropharynx is clear and moist and mucous membranes are normal.   Eyes: Pupils are equal, round, and reactive to light.   Neck: Normal range of motion.   Cardiovascular: Normal rate, regular rhythm and normal heart sounds.   Pulmonary/Chest: Effort normal and breath sounds normal. She has no wheezes.   Abdominal: Soft. Bowel sounds are normal. There is no tenderness.   Genitourinary:   Genitourinary Comments: Cervical Os closed with mild bleeding oozing. No clots noted within the vaginal vault   Musculoskeletal: Normal range of motion. She exhibits no edema.   Neurological: She is alert.   Skin: Skin is warm and dry. No rash noted.   Psychiatric: Mood, memory, affect and judgment normal.   Nursing note and vitals reviewed.      LAB RESULTS  Recent Results (from the past 24 hour(s))   Light Blue Top    Collection Time: 09/04/19  2:13 PM   Result Value Ref Range    Extra Tube hold for add-on    Green Top (Gel)    Collection Time: 09/04/19  2:13 PM   Result Value Ref Range    Extra Tube Hold for add-ons.    Lavender Top    Collection Time: 09/04/19  2:13 PM   Result Value Ref Range    Extra Tube hold for add-on    Gold Top - SST    Collection Time: 09/04/19  2:13 PM   Result Value Ref Range    Extra Tube Hold for add-ons.    Comprehensive Metabolic Panel    Collection Time: 09/04/19  2:13 PM   Result Value Ref Range    Glucose 110 (H) 65 - 99 mg/dL    BUN 10 6 - 20 mg/dL    Creatinine 0.65 0.57 - 1.00 mg/dL    Sodium 140 136 - 145 mmol/L    Potassium 3.9 3.5 - 5.2 mmol/L    Chloride 104 98 - 107 mmol/L    CO2 22.0 22.0 - 29.0 mmol/L    Calcium 9.3 8.6 - 10.5 mg/dL    Total Protein 7.8 6.0 - 8.5 g/dL    Albumin 4.50 3.50 - 5.20 g/dL    ALT (SGPT) 51 (H) 1 - 33 U/L    AST (SGOT) 23 1 - 32 U/L    Alkaline Phosphatase 78 39 - 117 U/L    Total Bilirubin 0.3 0.2 - 1.2 mg/dL    eGFR  African Amer 124 >60 mL/min/1.73    Globulin 3.3 gm/dL     A/G Ratio 1.4 g/dL    BUN/Creatinine Ratio 15.4 7.0 - 25.0    Anion Gap 14.0 5.0 - 15.0 mmol/L   hCG, Quantitative, Pregnancy    Collection Time: 09/04/19  2:13 PM   Result Value Ref Range    HCG Quantitative 7,565.00 mIU/mL   CBC Auto Differential    Collection Time: 09/04/19  2:13 PM   Result Value Ref Range    WBC 8.63 3.40 - 10.80 10*3/mm3    RBC 4.01 3.77 - 5.28 10*6/mm3    Hemoglobin 11.2 (L) 12.0 - 15.9 g/dL    Hematocrit 36.0 34.0 - 46.6 %    MCV 89.8 79.0 - 97.0 fL    MCH 27.9 26.6 - 33.0 pg    MCHC 31.1 (L) 31.5 - 35.7 g/dL    RDW 13.2 12.3 - 15.4 %    RDW-SD 43.5 37.0 - 54.0 fl    MPV 9.0 6.0 - 12.0 fL    Platelets 462 (H) 140 - 450 10*3/mm3    Neutrophil % 66.6 42.7 - 76.0 %    Lymphocyte % 24.9 19.6 - 45.3 %    Monocyte % 6.3 5.0 - 12.0 %    Eosinophil % 1.4 0.3 - 6.2 %    Basophil % 0.5 0.0 - 1.5 %    Immature Grans % 0.3 0.0 - 0.5 %    Neutrophils, Absolute 5.75 1.70 - 7.00 10*3/mm3    Lymphocytes, Absolute 2.15 0.70 - 3.10 10*3/mm3    Monocytes, Absolute 0.54 0.10 - 0.90 10*3/mm3    Eosinophils, Absolute 0.12 0.00 - 0.40 10*3/mm3    Basophils, Absolute 0.04 0.00 - 0.20 10*3/mm3    Immature Grans, Absolute 0.03 0.00 - 0.05 10*3/mm3    nRBC 0.0 0.0 - 0.2 /100 WBC   Type & Screen    Collection Time: 09/04/19  2:18 PM   Result Value Ref Range    ABO Type A     RH type Positive     Antibody Screen Negative     T&S Expiration Date 9/7/2019 11:59:59 PM        I ordered the above labs and reviewed the results    RADIOLOGY  US Ob 14 + Weeks Single or First Gestation    (Results Pending)       I ordered the above noted radiological studies and reviewed the images on the PACS system.       PROGRESS AND CONSULTS    Reviewed pt's history and workup with Dr. Forrester.   After a bedside evaluation; Dr Forrester agrees with the plan of care    1600- Care turned over to JENNIFER Riley. Ultrasound pending    CONSULTS  Time: 1500  Discussed case with Dr. Chou  Reviewed history, exam, results and treatments.  Discussed  "concerns and plan of care.  Dr. Chou would like ultrasound repeated- call with results    MEDICATIONS GIVEN IN ER  Medications   sodium chloride 0.9 % bolus 1,000 mL (1,000 mL Intravenous New Bag 9/4/19 1418)       /76 (Patient Position: Lying)   Pulse 90   Temp 98 °F (36.7 °C) (Tympanic)   Resp 15   Ht 167.6 cm (66\")   Wt 88.5 kg (195 lb)   LMP 07/03/2019   SpO2 98%   BMI 31.47 kg/m²         Documentation assistance provided by cesar Armas for GRISEL Savage.  Information recorded by the scribdayron was done at my direction and has been verified and validated by me.     Yvonne Armas  09/04/19 3645       Lakshmi Ghosh APRN  09/04/19 6035    "

## 2019-09-04 NOTE — DISCHARGE INSTRUCTIONS
Pelvic Rest: No douching, intercourse, or tampon use.  Drink plenty of fluids.   No strenuous activity.  Recheck with your OB/GYN tomorrow or friday, call when the office opens to schedule an appointment.  Return to the ER for heavy bleeding, severe pain, fever >100.4, passing out, intractable vomiting, any concerns.

## 2019-09-04 NOTE — TELEPHONE ENCOUNTER
Spoke with pt this am  Having cramping and heavy bleeding overnight, passed some large clots, no tissue from phone conversation.  Last xeralto was yesterday night.  Advised to not take dose today.  Advised to go to ER at [facility] for management of impending miscarriage while anticoagulated.    No dizziness or tachycardia or SOB at this moment.

## 2019-09-04 NOTE — ED PROVIDER NOTES
EMERGENCY DEPARTMENT ENCOUNTER    Room number:  30/30  Date Seen:  9/4/2019  Time of transfer:1530  PCP:  Provider, No Known    Laboratory Results:  Recent Results (from the past 24 hour(s))   Light Blue Top    Collection Time: 09/04/19  2:13 PM   Result Value Ref Range    Extra Tube hold for add-on    Green Top (Gel)    Collection Time: 09/04/19  2:13 PM   Result Value Ref Range    Extra Tube Hold for add-ons.    Lavender Top    Collection Time: 09/04/19  2:13 PM   Result Value Ref Range    Extra Tube hold for add-on    Gold Top - SST    Collection Time: 09/04/19  2:13 PM   Result Value Ref Range    Extra Tube Hold for add-ons.    Comprehensive Metabolic Panel    Collection Time: 09/04/19  2:13 PM   Result Value Ref Range    Glucose 110 (H) 65 - 99 mg/dL    BUN 10 6 - 20 mg/dL    Creatinine 0.65 0.57 - 1.00 mg/dL    Sodium 140 136 - 145 mmol/L    Potassium 3.9 3.5 - 5.2 mmol/L    Chloride 104 98 - 107 mmol/L    CO2 22.0 22.0 - 29.0 mmol/L    Calcium 9.3 8.6 - 10.5 mg/dL    Total Protein 7.8 6.0 - 8.5 g/dL    Albumin 4.50 3.50 - 5.20 g/dL    ALT (SGPT) 51 (H) 1 - 33 U/L    AST (SGOT) 23 1 - 32 U/L    Alkaline Phosphatase 78 39 - 117 U/L    Total Bilirubin 0.3 0.2 - 1.2 mg/dL    eGFR  African Amer 124 >60 mL/min/1.73    Globulin 3.3 gm/dL    A/G Ratio 1.4 g/dL    BUN/Creatinine Ratio 15.4 7.0 - 25.0    Anion Gap 14.0 5.0 - 15.0 mmol/L   hCG, Quantitative, Pregnancy    Collection Time: 09/04/19  2:13 PM   Result Value Ref Range    HCG Quantitative 7,565.00 mIU/mL   CBC Auto Differential    Collection Time: 09/04/19  2:13 PM   Result Value Ref Range    WBC 8.63 3.40 - 10.80 10*3/mm3    RBC 4.01 3.77 - 5.28 10*6/mm3    Hemoglobin 11.2 (L) 12.0 - 15.9 g/dL    Hematocrit 36.0 34.0 - 46.6 %    MCV 89.8 79.0 - 97.0 fL    MCH 27.9 26.6 - 33.0 pg    MCHC 31.1 (L) 31.5 - 35.7 g/dL    RDW 13.2 12.3 - 15.4 %    RDW-SD 43.5 37.0 - 54.0 fl    MPV 9.0 6.0 - 12.0 fL    Platelets 462 (H) 140 - 450 10*3/mm3    Neutrophil % 66.6 42.7 -  76.0 %    Lymphocyte % 24.9 19.6 - 45.3 %    Monocyte % 6.3 5.0 - 12.0 %    Eosinophil % 1.4 0.3 - 6.2 %    Basophil % 0.5 0.0 - 1.5 %    Immature Grans % 0.3 0.0 - 0.5 %    Neutrophils, Absolute 5.75 1.70 - 7.00 10*3/mm3    Lymphocytes, Absolute 2.15 0.70 - 3.10 10*3/mm3    Monocytes, Absolute 0.54 0.10 - 0.90 10*3/mm3    Eosinophils, Absolute 0.12 0.00 - 0.40 10*3/mm3    Basophils, Absolute 0.04 0.00 - 0.20 10*3/mm3    Immature Grans, Absolute 0.03 0.00 - 0.05 10*3/mm3    nRBC 0.0 0.0 - 0.2 /100 WBC   Type & Screen    Collection Time: 19  2:18 PM   Result Value Ref Range    ABO Type A     RH type Positive     Antibody Screen Negative     T&S Expiration Date 2019 11:59:59 PM      I reviewed the above results.    Radiology:  US Ob Limited 1 + Fetuses   Final Result   FIRST TRIMESTER OB ULTRASOUND     CLINICAL HISTORY: Blood in overall bone seen on ultrasound at OB/GYN's  office. Status post drug induced spontaneous . Vaginal bleeding.  Evaluate for retained products.     Multiple transabdominal and transvaginal images were obtained. No  residual gestational sac is identified within the endometrial cavity.  Only a small amount of amorphous echogenic material is present within  the endometrial cavity that is likely clotted blood. As expected, the  endometrium is mildly thickened. Both ovaries were visualized and are  unremarkable. There is no free fluid in the cul-de-sac.     IMPRESSIONS: Near complete . Only minimal retained products of  conception and/or blood are noted within the endometrial cavity.     This report was finalized on 2019 5:12 PM by Dr. Boaz Coronado M.D.   US Ob Transvaginal    (Results Pending)     I reviewed the above results    Medications ordered in ED:  Medications   sodium chloride 0.9 % bolus 1,000 mL (0 mL Intravenous Stopped 19 [id number])       Progress and Consult Notes:  1530:  Patient care transferred from GRISEL Savage pending US.    1710: Spoke  with US tech. The gestational sac visualized on yesterdays US is no longer visible.  The endometrium appears thickened and heterogenous with clotted blood.    1726: Received call from Dr. Jacob, (OB/GYN) and discussed pt's case.  It appears patient has had spontaneous miscarriage and Dr. Jacob recommends that pt follow up in office with Dr. Wing (OB/GYN) and return to the ED if her sx worsen.  Will hold Xarelto until tomorrow.    1742: Rechecked the patient who is resting comfortably and in NAD. She reports feeling much better.  Discussed the plan for discharge with instructions to f/u with OB/GYN for further evaluation and management. Strict RTER warnings given. Pt understands and agrees with the plan, all questions answered.      DISCHARGE    Patient discharged in stable condition.    Reviewed implications of results, diagnosis, meds, responsibility to follow up, warning signs and symptoms of possible worsening, potential complications and reasons to return to ER.    Patient/Family voiced understanding of above instructions.    Discussed plan for discharge, as there is no emergent indication for admission. Patient referred to primary care provider for BP management due to today's BP. Pt/family is agreeable and understands need for follow up and repeat testing.  Pt is aware that discharge does not mean that nothing is wrong but it indicates no emergency is present that requires admission and they must continue care with follow-up as given below or physician of their choice.       Diagnosis:  Final diagnoses:   Spontaneous miscarriage       Follow Up:  Jolene Wing MD  [address] [facility] [address] [phone]      tomorrow or friday      RX:     Medication List      No changes were made to your prescriptions during this visit.       Provider attestation:  I personally reviewed the past medical history, past surgical history, social history, family history, current medications, and allergies as  they appear in the chart.    The patient was seen and examined by myself and Dr. Forrester, who agree with plan.     --  Documentation assistance provided by cesar Esquivel for JENNIFER Fox.  Information recorded by the scribe was done at my direction and has been verified and validated by me.         Jamarcus Esquivel  09/04/19 2059       Luz Camilo PA  09/04/19 [id number]

## 2019-09-04 NOTE — ED PROVIDER NOTES
Pt presents to the ED c/o vaginal bleeding that began 3 days ago. She had light spotting at onset but developed heavier bleeding with clots this morning. She c/o intermittent abd cramping. Pt was recently dx with a blighted ovum. She is . She is currently taking Xarelto for a DVT she was dx with on 19. Pt denies fever, CP, SOA, cough, leg pain/swelling, abd pain, and N/V/D.    On exam,  Pt in NAD, A&Ox3  Heart RRR, no murmur  Lungs CTAB  Ab is soft and non tender    Plan-Labs reviewed. Plan to obtain pelvic US.       Attestation:  The KENN and I have discussed this patient's history, physical exam, and treatment plan.  I have reviewed the documentation and personally had a face to face interaction with the patient. I affirm the documentation and agree with the treatment and plan.  The attached note describes my personal findings.      Documentation assistance provided by cesar Mccarthy for Dr. Forrester Information recorded by the gilbertoibe was done at my direction and has been verified and validated by me.       Lisa Mccarthy  19 1605       Jens Forrester MD  19

## 2019-09-19 ENCOUNTER — OFFICE VISIT (OUTPATIENT)
Dept: OBSTETRICS AND GYNECOLOGY | Age: 39
End: 2019-09-19

## 2019-09-19 ENCOUNTER — PROCEDURE VISIT (OUTPATIENT)
Dept: OBSTETRICS AND GYNECOLOGY | Age: 39
End: 2019-09-19

## 2019-09-19 VITALS
DIASTOLIC BLOOD PRESSURE: 84 MMHG | WEIGHT: 207 LBS | BODY MASS INDEX: 33.27 KG/M2 | SYSTOLIC BLOOD PRESSURE: 124 MMHG | HEIGHT: 66 IN

## 2019-09-19 DIAGNOSIS — O03.9 COMPLETE ABORTION: Primary | ICD-10-CM

## 2019-09-19 DIAGNOSIS — O02.1 MISSED ABORTION: Primary | ICD-10-CM

## 2019-09-19 PROCEDURE — 76817 TRANSVAGINAL US OBSTETRIC: CPT | Performed by: OBSTETRICS & GYNECOLOGY

## 2019-09-19 PROCEDURE — 99213 OFFICE O/P EST LOW 20 MIN: CPT | Performed by: OBSTETRICS & GYNECOLOGY

## 2019-09-19 NOTE — PROGRESS NOTES
GYN Visit    2019    Patient: Le Gooden          MR#:[id number]      Chief Complaint   Patient presents with   • Follow-up     APRIL IS HERE TO F/U AFTER HAVING MISCARRIAGE.  U/S WITH MIC @ 11:0       History of Present Illness    39 y.o. female  who presents for  Follow up to [facility]  Everything went well  Feels ok, due to have menses soon  Discussed pregnancy prevention with pt and boyfriend here  Will plan BTL in near future either after anticoagulation done or when safe to be off one week prior to surgery  RBA of BTL discussed  Declines mirena IUD        Patient's last menstrual period was 2019.    ________________________________________  Patient Active Problem List   Diagnosis   • Displacement of intrauterine contraceptive device   • Acute venous embolism and thrombosis of deep vessels of distal end of left lower extremity (CMS/[facility])       Past Medical History:   Diagnosis Date   • Anemia    • Anxiety and depression    • DVT (deep venous thrombosis) (CMS/[facility]) 2019    left    • DVT (deep venous thrombosis) (CMS/[facility]) 2019    left   • Enlarged tonsils    • IUD migration (CMS/[facility])    • Migraine        Past Surgical History:   Procedure Laterality Date   • D&C HYSTEROSCOPY N/A 3/15/2018    Procedure: Hysteroscopy with removal of IUD;  Surgeon: Iglesia Hawkins MD;  Location: [facility];  Service: Obstetrics/Gynecology   • WISDOM TOOTH EXTRACTION         Social History     Tobacco Use   Smoking Status Never Smoker   Smokeless Tobacco Never Used       has a current medication list which includes the following prescription(s): misoprostol, multiple vitamin, rivaroxaban, and oxycodone-acetaminophen.  ________________________________________    Current contraception: condoms      The following portions of the patient's history were reviewed and updated as appropriate: allergies, current medications, past family history, past medical history, past social history, past surgical  "history and problem list.    Review of Systems   Constitutional: Negative for fatigue.   Gastrointestinal: Negative for nausea and vomiting.   Genitourinary: Negative for menstrual problem and pelvic pain.   Psychiatric/Behavioral: Negative for dysphoric mood. The patient is not nervous/anxious.    All other systems reviewed and are negative.      Pertinent items are noted in HPI.     Objective   Physical Exam    /84   Ht 167.6 cm (66\")   Wt 93.9 kg (207 lb)   LMP 2019   Breastfeeding? No   BMI 33.41 kg/m²    BP Readings from Last 3 Encounters:   19 124/84   19 108/68   19 118/80      Wt Readings from Last 3 Encounters:   19 93.9 kg (207 lb)   19 88.5 kg (195 lb)   19 93 kg (205 lb)      BMI: Estimated body mass index is 33.41 kg/m² as calculated from the following:    Height as of this encounter: 167.6 cm (66\").    Weight as of this encounter: 93.9 kg (207 lb).    Lungs: non labored breathing, no wheezing or tachpnea  Extremities: extremities normal, atraumatic, no cyanosis or edema  Skin: Skin color, texture, turgor normal. No rashes or lesions  Neurologic: Grossly normal  General:   alert, appears stated age and cooperative   Abdomen: soft, non-tender, without masses or organomegaly       Vulva: normal   Vagina: normal mucosa   Cervix: no cervical motion tenderness and no lesions   Uterus: normal size, mobile or non-tender   Adnexa: no mass, fullness, tenderness     Assessment:      April was seen today for follow-up.    Diagnoses and all orders for this visit:    Complete       See US- empty uterus , resolved miscarriage    Pt will call office to schedule BTL when she knows duration of anticoagulation therapy  She has appt with Dr Zamora next week        "

## 2019-10-09 DIAGNOSIS — Z30.2 STERILIZATION: Primary | ICD-10-CM

## 2019-10-10 PROBLEM — Z30.2 STERILIZATION: Status: ACTIVE | Noted: 2019-10-10

## 2019-11-04 ENCOUNTER — APPOINTMENT (OUTPATIENT)
Dept: PREADMISSION TESTING | Facility: HOSPITAL | Age: 39
End: 2019-11-04

## 2019-11-04 VITALS
HEART RATE: 80 BPM | OXYGEN SATURATION: 100 % | BODY MASS INDEX: 38.64 KG/M2 | TEMPERATURE: 98 F | RESPIRATION RATE: 16 BRPM | SYSTOLIC BLOOD PRESSURE: 124 MMHG | HEIGHT: 62 IN | DIASTOLIC BLOOD PRESSURE: 81 MMHG | WEIGHT: 210 LBS

## 2019-11-04 LAB
DEPRECATED RDW RBC AUTO: 41.9 FL (ref 37–54)
ERYTHROCYTE [DISTWIDTH] IN BLOOD BY AUTOMATED COUNT: 13.4 % (ref 12.3–15.4)
HCG SERPL QL: NEGATIVE
HCT VFR BLD AUTO: 31.3 % (ref 34–46.6)
HGB BLD-MCNC: 9.5 G/DL (ref 12–15.9)
MCH RBC QN AUTO: 26.2 PG (ref 26.6–33)
MCHC RBC AUTO-ENTMCNC: 30.4 G/DL (ref 31.5–35.7)
MCV RBC AUTO: 86.5 FL (ref 79–97)
PLATELET # BLD AUTO: 373 10*3/MM3 (ref 140–450)
PMV BLD AUTO: 8.6 FL (ref 6–12)
RBC # BLD AUTO: 3.62 10*6/MM3 (ref 3.77–5.28)
WBC NRBC COR # BLD: 6.11 10*3/MM3 (ref 3.4–10.8)

## 2019-11-04 PROCEDURE — 36415 COLL VENOUS BLD VENIPUNCTURE: CPT

## 2019-11-04 PROCEDURE — 85027 COMPLETE CBC AUTOMATED: CPT | Performed by: OBSTETRICS & GYNECOLOGY

## 2019-11-04 PROCEDURE — 84703 CHORIONIC GONADOTROPIN ASSAY: CPT | Performed by: OBSTETRICS & GYNECOLOGY

## 2019-11-04 RX ORDER — CHLORHEXIDINE GLUCONATE 500 MG/1
1 CLOTH TOPICAL TAKE AS DIRECTED
COMMUNITY
End: 2019-11-20

## 2019-11-04 NOTE — DISCHARGE INSTRUCTIONS
Take the following medications the morning of surgery with a small sip of water:    NONE    General Instructions:  • Do not eat solid food after midnight the night before surgery.  • You may drink clear liquids day of surgery but must stop at least one hour before your hospital arrival time.  • It is beneficial for you to have a clear drink that contains carbohydrates the day of surgery.  We suggest a 12 to 20 ounce bottle of Gatorade or Powerade for non-diabetic patients     Clear liquids are liquids you can see through.  Nothing red in color.     Plain water                               Sports drinks  Sodas                                   Gelatin (Jell-O)  Fruit juices without pulp such as white grape juice and apple juice  Popsicles that contain no fruit or yogurt  Tea or coffee (no cream or milk added)  Gatorade / Powerade  G2 / Powerade Zero      • Patients who avoid smoking, chewing tobacco and alcohol for 4 weeks prior to surgery have a reduced risk of post-operative complications.   • Do not smoke, use chewing tobacco or drink alcohol the day of surgery.   • Bring any papers given to you in the doctor’s office.  • Wear clean comfortable clothes.  • Do not wear contact lenses, false eyelashes or make-up.  Bring a case for your glasses.   • Remove all piercings.  Leave jewelry and any other valuables at home.  • Hair extensions with metal clips must be removed prior to surgery.  • The Pre-Admission Testing nurse will instruct you to bring medications if unable to obtain an accurate list in Pre-Admission Testing.  • REPORT TO MAIN SURGERY ON 11-6-19 AT 1130            Preventing a Surgical Site Infection:  • For 2 to 3 days before surgery, avoid shaving with a razor because the razor can irritate skin and make it easier to develop an infection.    • Any areas of open skin can increase the risk of a post-operative wound infection by allowing bacteria to enter and travel throughout the body.  Notify your  surgeon if you have any skin wounds / rashes even if it is not near the expected surgical site.  The area will need assessed to determine if surgery should be delayed until it is healed.  • The night prior to surgery sleep in a clean bed with clean clothing.  Do not allow pets to sleep with you.  • Shower on the morning of surgery using a fresh bar of anti-bacterial soap (such as Dial) and clean washcloth.  Dry with a clean towel and dress in clean clothing.  • Ask your surgeon if you will be receiving antibiotics prior to surgery.  • Make sure you, your family, and all healthcare providers clean their hands with soap and water or an alcohol based hand  before caring for you or your wound.    Day of surgery:  Your arrival time is approximately two hours before your scheduled surgery time.  Upon arrival, a Pre-op nurse and Anesthesiologist will review your health history, obtain vital signs, and answer questions you may have.  The only belongings needed at this time will be a list of your home medications and if applicable your C-PAP/BI-PAP machine.  If you are staying overnight your family can leave the rest of your belongings in the car and bring them to your room later.  A Pre-op nurse will start an IV and you may receive medication in preparation for surgery, including something to help you relax.  Your family will be able to see you in the Pre-op area.  Two visitors at a time will be allowed in the Pre-op room.  While you are in surgery your family should notify the waiting room  if they leave the waiting room area and provide a contact phone number.    Please be aware that surgery does come with discomfort.  We want to make every effort to control your discomfort so please discuss any uncontrolled symptoms with your nurse.   Your doctor will most likely have prescribed pain medications.      If you are going home after surgery you will receive individualized written care instructions before  being discharged.  A responsible adult must drive you to and from the hospital on the day of your surgery and stay with you for 24 hours.      2% CHLORAHEXIDINE GLUCONATE* CLOTH  Preparing or “prepping” skin before surgery can reduce the risk of infection at the surgical site. To make the process easier, [facility] has chosen disposable cloths moistened with a rinse-free, 2% Chlorhexidine Gluconate (CHG) antiseptic solution. The steps below outline the prepping process and should be carefully followed.        Use the prep cloth on the area that is circled in the diagram             Directions Night before Surgery  1) Shower using a fresh bar of anti-bacterial soap (such as Dial) and clean washcloth.  Use a clean towel to completely dry your skin.  2) Do not use any lotions, oils or creams on your skin.  3) Open the package and remove 1 cloth, wipe your skin for 30 seconds in a circular motion.  Allow to dry for 3 minutes.  4) Repeat #3 with second cloth.  5) Do not touch your eyes, ears, or mouth with the prep cloth.  6) Allow the wet prep solution to air dry.  7) Discard the prep cloth and wash your hands with soap and water.   8) Dress in clean bed clothes and sleep on fresh clean bed sheets.   9) You may experience some temporary itching after the prep.    Directions Day of Surgery  1) Repeat steps 1,2,3,4,5,6,7, and 9.   2) Dress in clean clothes before coming to the hospital.      You have received a list of surgical assistants for your reference.  If you have any questions please call Pre-Admission Testing at [phone].  Deductibles and co-payments are collected on the day of service. Please be prepared to pay the required co-pay, deductible or deposit on the day of service as defined by your plan.

## 2019-11-06 ENCOUNTER — ANESTHESIA (OUTPATIENT)
Dept: PERIOP | Facility: HOSPITAL | Age: 39
End: 2019-11-06

## 2019-11-06 ENCOUNTER — HOSPITAL ENCOUNTER (OUTPATIENT)
Facility: HOSPITAL | Age: 39
Setting detail: HOSPITAL OUTPATIENT SURGERY
Discharge: HOME | End: 2019-11-06
Attending: OBSTETRICS & GYNECOLOGY | Admitting: OBSTETRICS & GYNECOLOGY

## 2019-11-06 ENCOUNTER — ANESTHESIA EVENT (OUTPATIENT)
Dept: PERIOP | Facility: HOSPITAL | Age: 39
End: 2019-11-06

## 2019-11-06 VITALS
OXYGEN SATURATION: 100 % | RESPIRATION RATE: 16 BRPM | TEMPERATURE: 97.8 F | DIASTOLIC BLOOD PRESSURE: 84 MMHG | SYSTOLIC BLOOD PRESSURE: 129 MMHG | BODY MASS INDEX: 38.64 KG/M2 | HEART RATE: 58 BPM | HEIGHT: 62 IN | WEIGHT: 210 LBS

## 2019-11-06 PROCEDURE — 25010000002 MIDAZOLAM PER 1 MG: Performed by: ANESTHESIOLOGY

## 2019-11-06 PROCEDURE — S0260 H&P FOR SURGERY: HCPCS | Performed by: OBSTETRICS & GYNECOLOGY

## 2019-11-06 PROCEDURE — 58670 LAPAROSCOPY TUBAL CAUTERY: CPT | Performed by: OBSTETRICS & GYNECOLOGY

## 2019-11-06 PROCEDURE — 25010000002 NEOSTIGMINE PER 0.5 MG: Performed by: NURSE ANESTHETIST, CERTIFIED REGISTERED

## 2019-11-06 PROCEDURE — 25010000002 PROPOFOL 10 MG/ML EMULSION: Performed by: NURSE ANESTHETIST, CERTIFIED REGISTERED

## 2019-11-06 PROCEDURE — 25010000002 ONDANSETRON PER 1 MG: Performed by: NURSE ANESTHETIST, CERTIFIED REGISTERED

## 2019-11-06 PROCEDURE — 25010000002 DEXAMETHASONE PER 1 MG: Performed by: NURSE ANESTHETIST, CERTIFIED REGISTERED

## 2019-11-06 PROCEDURE — 25010000002 FENTANYL CITRATE (PF) 100 MCG/2ML SOLUTION: Performed by: NURSE ANESTHETIST, CERTIFIED REGISTERED

## 2019-11-06 RX ORDER — DEXAMETHASONE SODIUM PHOSPHATE 10 MG/ML
INJECTION INTRAMUSCULAR; INTRAVENOUS AS NEEDED
Status: DISCONTINUED | OUTPATIENT
Start: 2019-11-06 | End: 2019-11-06 | Stop reason: SURG

## 2019-11-06 RX ORDER — LABETALOL HYDROCHLORIDE 5 MG/ML
5 INJECTION, SOLUTION INTRAVENOUS
Status: DISCONTINUED | OUTPATIENT
Start: 2019-11-06 | End: 2019-11-06 | Stop reason: HOSPADM

## 2019-11-06 RX ORDER — PROMETHAZINE HYDROCHLORIDE 25 MG/1
25 SUPPOSITORY RECTAL ONCE AS NEEDED
Status: DISCONTINUED | OUTPATIENT
Start: 2019-11-06 | End: 2019-11-06 | Stop reason: HOSPADM

## 2019-11-06 RX ORDER — PROMETHAZINE HYDROCHLORIDE 25 MG/1
25 TABLET ORAL ONCE AS NEEDED
Status: DISCONTINUED | OUTPATIENT
Start: 2019-11-06 | End: 2019-11-06 | Stop reason: HOSPADM

## 2019-11-06 RX ORDER — HYDROMORPHONE HYDROCHLORIDE 1 MG/ML
0.5 INJECTION, SOLUTION INTRAMUSCULAR; INTRAVENOUS; SUBCUTANEOUS
Status: DISCONTINUED | OUTPATIENT
Start: 2019-11-06 | End: 2019-11-06 | Stop reason: HOSPADM

## 2019-11-06 RX ORDER — ONDANSETRON 2 MG/ML
INJECTION INTRAMUSCULAR; INTRAVENOUS AS NEEDED
Status: DISCONTINUED | OUTPATIENT
Start: 2019-11-06 | End: 2019-11-06 | Stop reason: SURG

## 2019-11-06 RX ORDER — HYDROCODONE BITARTRATE AND ACETAMINOPHEN 7.5; 325 MG/1; MG/1
1 TABLET ORAL ONCE AS NEEDED
Status: DISCONTINUED | OUTPATIENT
Start: 2019-11-06 | End: 2019-11-06 | Stop reason: HOSPADM

## 2019-11-06 RX ORDER — FENTANYL CITRATE 50 UG/ML
50 INJECTION, SOLUTION INTRAMUSCULAR; INTRAVENOUS
Status: DISCONTINUED | OUTPATIENT
Start: 2019-11-06 | End: 2019-11-06 | Stop reason: HOSPADM

## 2019-11-06 RX ORDER — MAGNESIUM HYDROXIDE 1200 MG/15ML
LIQUID ORAL AS NEEDED
Status: DISCONTINUED | OUTPATIENT
Start: 2019-11-06 | End: 2019-11-06 | Stop reason: HOSPADM

## 2019-11-06 RX ORDER — OXYCODONE HYDROCHLORIDE AND ACETAMINOPHEN 5; 325 MG/1; MG/1
1-2 TABLET ORAL EVERY 4 HOURS PRN
Qty: 30 TABLET | Refills: 0 | Status: SHIPPED | OUTPATIENT
Start: 2019-11-06 | End: 2019-11-20

## 2019-11-06 RX ORDER — ROCURONIUM BROMIDE 10 MG/ML
INJECTION, SOLUTION INTRAVENOUS AS NEEDED
Status: DISCONTINUED | OUTPATIENT
Start: 2019-11-06 | End: 2019-11-06 | Stop reason: SURG

## 2019-11-06 RX ORDER — MIDAZOLAM HYDROCHLORIDE 1 MG/ML
1 INJECTION INTRAMUSCULAR; INTRAVENOUS
Status: DISCONTINUED | OUTPATIENT
Start: 2019-11-06 | End: 2019-11-06 | Stop reason: HOSPADM

## 2019-11-06 RX ORDER — PROMETHAZINE HYDROCHLORIDE 25 MG/ML
12.5 INJECTION, SOLUTION INTRAMUSCULAR; INTRAVENOUS ONCE AS NEEDED
Status: DISCONTINUED | OUTPATIENT
Start: 2019-11-06 | End: 2019-11-06 | Stop reason: HOSPADM

## 2019-11-06 RX ORDER — SODIUM CHLORIDE 0.9 % (FLUSH) 0.9 %
3 SYRINGE (ML) INJECTION EVERY 12 HOURS SCHEDULED
Status: DISCONTINUED | OUTPATIENT
Start: 2019-11-06 | End: 2019-11-06 | Stop reason: HOSPADM

## 2019-11-06 RX ORDER — OXYCODONE HYDROCHLORIDE AND ACETAMINOPHEN 5; 325 MG/1; MG/1
1 TABLET ORAL ONCE AS NEEDED
Status: DISCONTINUED | OUTPATIENT
Start: 2019-11-06 | End: 2019-11-06 | Stop reason: HOSPADM

## 2019-11-06 RX ORDER — GLYCOPYRROLATE 0.2 MG/ML
INJECTION INTRAMUSCULAR; INTRAVENOUS AS NEEDED
Status: DISCONTINUED | OUTPATIENT
Start: 2019-11-06 | End: 2019-11-06 | Stop reason: SURG

## 2019-11-06 RX ORDER — EPHEDRINE SULFATE 50 MG/ML
5 INJECTION, SOLUTION INTRAVENOUS ONCE AS NEEDED
Status: DISCONTINUED | OUTPATIENT
Start: 2019-11-06 | End: 2019-11-06 | Stop reason: HOSPADM

## 2019-11-06 RX ORDER — LIDOCAINE HYDROCHLORIDE 20 MG/ML
INJECTION, SOLUTION INFILTRATION; PERINEURAL AS NEEDED
Status: DISCONTINUED | OUTPATIENT
Start: 2019-11-06 | End: 2019-11-06 | Stop reason: SURG

## 2019-11-06 RX ORDER — DIPHENHYDRAMINE HYDROCHLORIDE 50 MG/ML
12.5 INJECTION INTRAMUSCULAR; INTRAVENOUS
Status: DISCONTINUED | OUTPATIENT
Start: 2019-11-06 | End: 2019-11-06 | Stop reason: HOSPADM

## 2019-11-06 RX ORDER — BUPIVACAINE HYDROCHLORIDE 5 MG/ML
INJECTION, SOLUTION EPIDURAL; INTRACAUDAL AS NEEDED
Status: DISCONTINUED | OUTPATIENT
Start: 2019-11-06 | End: 2019-11-06 | Stop reason: HOSPADM

## 2019-11-06 RX ORDER — FLUMAZENIL 0.1 MG/ML
0.2 INJECTION INTRAVENOUS AS NEEDED
Status: DISCONTINUED | OUTPATIENT
Start: 2019-11-06 | End: 2019-11-06 | Stop reason: HOSPADM

## 2019-11-06 RX ORDER — LIDOCAINE HYDROCHLORIDE 10 MG/ML
0.5 INJECTION, SOLUTION EPIDURAL; INFILTRATION; INTRACAUDAL; PERINEURAL ONCE AS NEEDED
Status: DISCONTINUED | OUTPATIENT
Start: 2019-11-06 | End: 2019-11-06 | Stop reason: HOSPADM

## 2019-11-06 RX ORDER — HYDRALAZINE HYDROCHLORIDE 20 MG/ML
5 INJECTION INTRAMUSCULAR; INTRAVENOUS
Status: DISCONTINUED | OUTPATIENT
Start: 2019-11-06 | End: 2019-11-06 | Stop reason: HOSPADM

## 2019-11-06 RX ORDER — MIDAZOLAM HYDROCHLORIDE 1 MG/ML
2 INJECTION INTRAMUSCULAR; INTRAVENOUS
Status: DISCONTINUED | OUTPATIENT
Start: 2019-11-06 | End: 2019-11-06 | Stop reason: HOSPADM

## 2019-11-06 RX ORDER — ONDANSETRON 2 MG/ML
4 INJECTION INTRAMUSCULAR; INTRAVENOUS ONCE AS NEEDED
Status: DISCONTINUED | OUTPATIENT
Start: 2019-11-06 | End: 2019-11-06 | Stop reason: HOSPADM

## 2019-11-06 RX ORDER — PROPOFOL 10 MG/ML
VIAL (ML) INTRAVENOUS AS NEEDED
Status: DISCONTINUED | OUTPATIENT
Start: 2019-11-06 | End: 2019-11-06 | Stop reason: SURG

## 2019-11-06 RX ORDER — NALOXONE HCL 0.4 MG/ML
0.2 VIAL (ML) INJECTION AS NEEDED
Status: DISCONTINUED | OUTPATIENT
Start: 2019-11-06 | End: 2019-11-06 | Stop reason: HOSPADM

## 2019-11-06 RX ORDER — FAMOTIDINE 10 MG/ML
20 INJECTION, SOLUTION INTRAVENOUS ONCE
Status: COMPLETED | OUTPATIENT
Start: 2019-11-06 | End: 2019-11-06

## 2019-11-06 RX ORDER — ACETAMINOPHEN 325 MG/1
650 TABLET ORAL ONCE AS NEEDED
Status: DISCONTINUED | OUTPATIENT
Start: 2019-11-06 | End: 2019-11-06 | Stop reason: HOSPADM

## 2019-11-06 RX ORDER — DIPHENHYDRAMINE HCL 25 MG
25 CAPSULE ORAL
Status: DISCONTINUED | OUTPATIENT
Start: 2019-11-06 | End: 2019-11-06 | Stop reason: HOSPADM

## 2019-11-06 RX ORDER — FENTANYL CITRATE 50 UG/ML
INJECTION, SOLUTION INTRAMUSCULAR; INTRAVENOUS AS NEEDED
Status: DISCONTINUED | OUTPATIENT
Start: 2019-11-06 | End: 2019-11-06 | Stop reason: SURG

## 2019-11-06 RX ORDER — PROMETHAZINE HYDROCHLORIDE 25 MG/ML
6.25 INJECTION, SOLUTION INTRAMUSCULAR; INTRAVENOUS
Status: DISCONTINUED | OUTPATIENT
Start: 2019-11-06 | End: 2019-11-06 | Stop reason: HOSPADM

## 2019-11-06 RX ORDER — SODIUM CHLORIDE, SODIUM LACTATE, POTASSIUM CHLORIDE, CALCIUM CHLORIDE 600; 310; 30; 20 MG/100ML; MG/100ML; MG/100ML; MG/100ML
9 INJECTION, SOLUTION INTRAVENOUS CONTINUOUS
Status: DISCONTINUED | OUTPATIENT
Start: 2019-11-06 | End: 2019-11-06 | Stop reason: HOSPADM

## 2019-11-06 RX ORDER — OXYCODONE AND ACETAMINOPHEN 7.5; 325 MG/1; MG/1
1 TABLET ORAL ONCE AS NEEDED
Status: DISCONTINUED | OUTPATIENT
Start: 2019-11-06 | End: 2019-11-06 | Stop reason: HOSPADM

## 2019-11-06 RX ORDER — SODIUM CHLORIDE 0.9 % (FLUSH) 0.9 %
3-10 SYRINGE (ML) INJECTION AS NEEDED
Status: DISCONTINUED | OUTPATIENT
Start: 2019-11-06 | End: 2019-11-06 | Stop reason: HOSPADM

## 2019-11-06 RX ORDER — OXYCODONE HYDROCHLORIDE AND ACETAMINOPHEN 5; 325 MG/1; MG/1
TABLET ORAL
Status: COMPLETED
Start: 2019-11-06 | End: 2019-11-06

## 2019-11-06 RX ADMIN — FENTANYL CITRATE 50 MCG: 50 INJECTION INTRAMUSCULAR; INTRAVENOUS at 14:27

## 2019-11-06 RX ADMIN — MIDAZOLAM 1 MG: 1 INJECTION INTRAMUSCULAR; INTRAVENOUS at 13:05

## 2019-11-06 RX ADMIN — DEXAMETHASONE SODIUM PHOSPHATE 4 MG: 10 INJECTION INTRAMUSCULAR; INTRAVENOUS at 13:59

## 2019-11-06 RX ADMIN — LIDOCAINE HYDROCHLORIDE 100 MG: 20 INJECTION, SOLUTION INFILTRATION; PERINEURAL at 13:21

## 2019-11-06 RX ADMIN — OXYCODONE HYDROCHLORIDE AND ACETAMINOPHEN 1 TABLET: 5; 325 TABLET ORAL at 15:44

## 2019-11-06 RX ADMIN — FENTANYL CITRATE 50 MCG: 50 INJECTION INTRAMUSCULAR; INTRAVENOUS at 14:35

## 2019-11-06 RX ADMIN — FAMOTIDINE 20 MG: 10 INJECTION INTRAVENOUS at 13:06

## 2019-11-06 RX ADMIN — GLYCOPYRROLATE 0.8 MG: 0.2 INJECTION INTRAMUSCULAR; INTRAVENOUS at 14:19

## 2019-11-06 RX ADMIN — SODIUM CHLORIDE, POTASSIUM CHLORIDE, SODIUM LACTATE AND CALCIUM CHLORIDE 9 ML/HR: 600; 310; 30; 20 INJECTION, SOLUTION INTRAVENOUS at 13:05

## 2019-11-06 RX ADMIN — ONDANSETRON 4 MG: 2 INJECTION INTRAMUSCULAR; INTRAVENOUS at 14:19

## 2019-11-06 RX ADMIN — ROCURONIUM BROMIDE 40 MG: 10 INJECTION INTRAVENOUS at 13:21

## 2019-11-06 RX ADMIN — FENTANYL CITRATE 50 MCG: 50 INJECTION INTRAMUSCULAR; INTRAVENOUS at 13:21

## 2019-11-06 RX ADMIN — PROPOFOL 200 MG: 10 INJECTION, EMULSION INTRAVENOUS at 13:21

## 2019-11-06 RX ADMIN — NEOSTIGMINE METHYLSULFATE 5 MG: 1 INJECTION INTRAMUSCULAR; INTRAVENOUS; SUBCUTANEOUS at 14:19

## 2019-11-06 NOTE — OP NOTE
TUBAL COAGULATION LAPAROSCOPIC  Procedure Report    Patient Name:  Le Gooden  YOB: 1980    Date of Surgery:  11/6/2019     Indications:  Desires sterilization    Pre-op Diagnosis:   Sterilization [Z30.2]       Post-Op Diagnosis Codes:     * Sterilization [Z30.2]    Procedure/CPT® Codes:      Procedure(s):  LAPAROSCOPIC TUBAL LIGATION    Staff:  Surgeon(s):  Jolene Wing MD         Anesthesia: General    Estimated Blood Loss: 10 mL    Implants:    Nothing was implanted during the procedure    Specimen:          None        Findings: normal uterus, tubes and ovaries, no adhesions or endometriosis    Complications: none    Description of Procedure:   Procedure: Patient was taken to the operating room where anesthesia was undertaken. She was then placed in dorsal lithotomy position and placed in Devonte stirrups. She was prepped and draped in the usual sterile fashion. A speculum was placed in the vagina to visualize the cervix and the cervix was then grasped with a single tooth tenaculum and pulled into view. The cervix was dilated to place a uterine manipulator.  Attention was then turned to the abdomen where a small incision was made below the umbilicus.  The incision was extended to the fascia and fascia was tagged with suture, peritoneum was entered and trocar was placed.  The abdomen was insufflated with CO2 gas.      The right tube was elevated and fimbria visualized.  Bipolar klepingers were used to cauterize a mid portion of the right tube for 1-2 cm .  The same procedure was performed on the left tube.  Survey of upper abdomen , bowel, and pelvic structures were normal.     The  CO2 gas let out of abdomen and umbilical port removed.       Local marcaine was used around the incision. The fascia was closed with 0-vicryl suture.  The skin was closed with 4-0 vicryl suture subcutaneously.  The uterine manipulator was removed.  Cervix was hemostatic. Sponge, lap and needle counts were correct  at the end of the procedure. The patient was taken to the PACU in stable condition.        Jolene Wing MD     Date: 11/6/2019  Time: 2:43 PM

## 2019-11-06 NOTE — H&P
Patient Care Team:  Provider, No Known as PCP - General    Chief complaint desires sterilization    Subjective     Patient is a 39 y.o. female presents with desire for BTL.  Recent unplanned pregnancy and miscarried.  She is currently anticoagulated due to recurrent DVT.  Hematologist recommended off her oral med for 3 days prior and restart day after surgery. RBA of laparoscopic BTL reviewed.     Review of Systems   Pertinent items are noted in HPI, all other systems reviewed and negative    History  Past Medical History:   Diagnosis Date   • Anemia    • Anxiety and depression    • DVT (deep venous thrombosis) (CMS/[facility]) 09/01/2019    RIGHT LEG        BEING FOLLOWED BY DR. RENO   • DVT (deep venous thrombosis) (CMS/[facility]) 05/01/2019    left   • Enlarged tonsils    • IUD migration (CMS/[facility])     IUD REMOVED  3- 2018   • Migraine      Past Surgical History:   Procedure Laterality Date   • D&C HYSTEROSCOPY N/A 3/15/2018    Procedure: Hysteroscopy with removal of IUD;  Surgeon: Iglesia Hawkins MD;  Location: [facility];  Service: Obstetrics/Gynecology   • WISDOM TOOTH EXTRACTION  2011     Family History   Problem Relation Age of Onset   • No Known Problems Mother    • No Known Problems Father    • No Known Problems Sister    • No Known Problems Brother    • No Known Problems Maternal Grandmother    • No Known Problems Maternal Grandfather    • Deep vein thrombosis Paternal Grandmother    • No Known Problems Paternal Grandfather    • No Known Problems Son    • No Known Problems Daughter    • No Known Problems Son    • Breast cancer Neg Hx    • Ovarian cancer Neg Hx    • Uterine cancer Neg Hx    • Colon cancer Neg Hx    • Pulmonary embolism Neg Hx    • Malig Hyperthermia Neg Hx      Social History     Tobacco Use   • Smoking status: Never Smoker   • Smokeless tobacco: Never Used   Substance Use Topics   • Alcohol use: Yes     Comment: OCCAS   • Drug use: No     Medications Prior to Admission   Medication Sig  Dispense Refill Last Dose   • Chlorhexidine Gluconate Cloth 2 % pads Apply 1 application topically Take As Directed. Use as directed preop    11/6/2019 at 0000   • Rivaroxaban (XARELTO) tablet therapy pack starter pack Take one 15 mg tablet twice daily with food for 21 days.  Followed by one 20 mg tablet by mouth once daily with food.    11/2/2019 at 0900   • Multiple Vitamin (MULTI VITAMIN PO) Take 1 tablet by mouth Daily.   More than a month at Unknown time     Allergies:  Patient has no known allergies.    Objective     Vital Signs  Temp:  [98.4 °F (36.9 °C)] 98.4 °F (36.9 °C)  Heart Rate:  [66] 66  Resp:  [18] 18  BP: (131)/(84) 131/84    Physical Exam:    General Appearance: alert, appears stated age and cooperative  Neck: no adenopathy, supple, trachea midline and no thyromegaly  Lungs: respirations regular, respirations even and respirations unlabored  Heart: regular rhythm & normal rate  Abdomen: normal bowel sounds, no masses, soft non-tender, no guarding and no rebound tenderness  Pelvic: cervix normal in appearance, external genitalia normal, no adnexal masses or tenderness, uterus normal in size, shape, and consistency and vagina normal without dischartge  Extremities: moves extremities well, no edema, no cyanosis and no redness  Skin: no bleeding, bruising or rash  Neurologic: Mental Status orientated to person, place, time and situation, Speech normal content and execusion  Psych: normal    Results Review:    I reviewed the patient's new clinical results.    Assessment/Plan       Sterilization      Laparoscopic bilateral tubal coagulation planned    I discussed the patients findings and my recommendations with patient and family.     Jolene Wing MD  11/06/19  12:43 PM    Time: 20 min

## 2019-11-06 NOTE — ANESTHESIA POSTPROCEDURE EVALUATION
"Patient: Le Gooden    Procedure Summary     Date:  11/06/19 Room / Location:  [facility] OR 09 / [facility] MAIN OR    Anesthesia Start:  1319 Anesthesia Stop:  1440    Procedure:  LAPAROSCOPIC TUBAL LIGATION (Bilateral Abdomen) Diagnosis:       Sterilization      (Sterilization [Z30.2])    Surgeon:  Jolene Wing MD Provider:  Faizan Moise MD    Anesthesia Type:  general ASA Status:  3          Anesthesia Type: general  Last vitals  BP   128/88 (11/06/19 1600)   Temp   36.6 °C (97.8 °F) (11/06/19 1545)   Pulse   66 (11/06/19 1600)   Resp   16 (11/06/19 1600)     SpO2   99 % (11/06/19 1600)     Post Anesthesia Care and Evaluation    Patient location during evaluation: bedside  Patient participation: complete - patient participated  Level of consciousness: awake and alert  Pain management: adequate  Airway patency: patent  Anesthetic complications: No anesthetic complications    Cardiovascular status: acceptable  Respiratory status: acceptable  Hydration status: acceptable    Comments: /88   Pulse 66   Temp 36.6 °C (97.8 °F) (Oral)   Resp 16   Ht 157.5 cm (62\")   Wt 95.3 kg (210 lb)   LMP 10/28/2019   SpO2 99%   BMI 38.41 kg/m²       "

## 2019-11-06 NOTE — ANESTHESIA PROCEDURE NOTES
Airway  Urgency: elective    Date/Time: 11/6/2019 1:23 PM  Airway not difficult    General Information and Staff    Patient location during procedure: OR  Anesthesiologist: Faizan Moise MD  CRNA: Christine Gunn CRNA    Indications and Patient Condition  Indications for airway management: airway protection    Preoxygenated: yes  MILS not maintained throughout  Mask difficulty assessment: 1 - vent by mask    Final Airway Details  Final airway type: endotracheal airway      Successful airway: ETT  Cuffed: yes   Successful intubation technique: direct laryngoscopy  Endotracheal tube insertion site: oral  Blade: Piper  Blade size: 2  ETT size (mm): 7.0  Cormack-Lehane Classification: grade I - full view of glottis  Placement verified by: chest auscultation and capnometry   Cuff volume (mL): 8  Measured from: lips  Number of attempts at approach: 1  Assessment: lips, teeth, and gum same as pre-op and atraumatic intubation    Additional Comments  Lma insertion appears atraumatic. Dentition intact.

## 2019-11-06 NOTE — ANESTHESIA POSTPROCEDURE EVALUATION
"Patient: Le Gooden    Procedure Summary     Date:  11/06/19 Room / Location:  [facility] OR 09 / [facility] MAIN OR    Anesthesia Start:  1319 Anesthesia Stop:  1440    Procedure:  LAPAROSCOPIC TUBAL LIGATION (Bilateral Abdomen) Diagnosis:       Sterilization      (Sterilization [Z30.2])    Surgeon:  Jolene Wing MD Provider:  Faizan Moise MD    Anesthesia Type:  general ASA Status:  3          Anesthesia Type: general  Last vitals  BP   129/84 (11/06/19 1615)   Temp   36.6 °C (97.8 °F) (11/06/19 1545)   Pulse   58 (11/06/19 1615)   Resp   16 (11/06/19 1615)     SpO2   100 % (11/06/19 1615)     Post Anesthesia Care and Evaluation    Patient location during evaluation: bedside  Patient participation: complete - patient participated  Level of consciousness: awake and alert  Pain management: adequate  Airway patency: patent  Anesthetic complications: No anesthetic complications    Cardiovascular status: acceptable  Respiratory status: acceptable  Hydration status: acceptable    Comments: /84   Pulse 58   Temp 36.6 °C (97.8 °F) (Oral)   Resp 16   Ht 157.5 cm (62\")   Wt 95.3 kg (210 lb)   LMP 10/28/2019   SpO2 100%   BMI 38.41 kg/m²       "

## 2019-11-20 ENCOUNTER — OFFICE VISIT (OUTPATIENT)
Dept: OBSTETRICS AND GYNECOLOGY | Age: 39
End: 2019-11-20

## 2019-11-20 VITALS
SYSTOLIC BLOOD PRESSURE: 128 MMHG | BODY MASS INDEX: 38.46 KG/M2 | HEIGHT: 62 IN | DIASTOLIC BLOOD PRESSURE: 76 MMHG | WEIGHT: 209 LBS

## 2019-11-20 DIAGNOSIS — Z09 POSTOPERATIVE EXAMINATION: Primary | ICD-10-CM

## 2019-11-20 PROCEDURE — 99024 POSTOP FOLLOW-UP VISIT: CPT | Performed by: OBSTETRICS & GYNECOLOGY

## 2019-11-20 NOTE — PROGRESS NOTES
GYN Visit    2019    Patient: Le Gooden          MR#:[id number]      Chief Complaint   Patient presents with   • Post-op     F/U TUBAL ON 2019.  SIG OTHER = JENNY.        History of Present Illness    39 y.o. female  who presents for  Post op BTL  Needs refill xeralto for one more month  Did well with surgery   Had pap earler this year normal with Dr Hearn  [employer] tech        Patient's last menstrual period was 10/28/2019.    ________________________________________  Patient Active Problem List   Diagnosis   • Displacement of intrauterine contraceptive device   • Acute venous embolism and thrombosis of deep vessels of distal end of left lower extremity (CMS/[facility])   • Sterilization       Past Medical History:   Diagnosis Date   • Anemia    • Anxiety and depression    • DVT (deep venous thrombosis) (CMS/[facility]) 2019    RIGHT LEG        BEING FOLLOWED BY DR. RENO   • DVT (deep venous thrombosis) (CMS/[facility]) 2019    left   • Enlarged tonsils    • IUD migration (CMS/[facility])     IUD REMOVED  3- 2018   • Migraine        Past Surgical History:   Procedure Laterality Date   • D&C HYSTEROSCOPY N/A 3/15/2018    Procedure: Hysteroscopy with removal of IUD;  Surgeon: Iglesia Hawkins MD;  Location: [facility];  Service: Obstetrics/Gynecology   • TUBAL COAGULATION LAPAROSCOPIC Bilateral 2019    Procedure: LAPAROSCOPIC TUBAL LIGATION;  Surgeon: Jolene Wing MD;  Location: [facility];  Service: Obstetrics/Gynecology   • WISDOM TOOTH EXTRACTION         Social History     Tobacco Use   Smoking Status Never Smoker   Smokeless Tobacco Never Used       has a current medication list which includes the following prescription(s): rivaroxaban.  ________________________________________    Current contraception: tubal ligation      The following portions of the patient's history were reviewed and updated as appropriate: allergies, current medications, past family history, past medical  "history, past social history, past surgical history and problem list.    Review of Systems    Pertinent items are noted in HPI.     Objective   Physical Exam    /76   Ht 157.5 cm (62\")   Wt 94.8 kg (209 lb)   LMP 10/28/2019   Breastfeeding? No   BMI 38.23 kg/m²    BP Readings from Last 3 Encounters:   11/20/19 128/76   11/06/19 129/84   11/04/19 124/81      Wt Readings from Last 3 Encounters:   11/20/19 94.8 kg (209 lb)   11/06/19 95.3 kg (210 lb)   11/04/19 95.3 kg (210 lb)      BMI: Estimated body mass index is 38.23 kg/m² as calculated from the following:    Height as of this encounter: 157.5 cm (62\").    Weight as of this encounter: 94.8 kg (209 lb).    Lungs: non labored breathing, no wheezing or tachpnea  Extremities: extremities normal, atraumatic, no cyanosis or edema  Skin: Skin color, texture, turgor normal. No rashes or lesions  Neurologic: Grossly normal  General:   alert, appears stated age and cooperative   Abdomen: soft, non-tender, without masses or organomegaly and incision well healed                             Assessment:      April was seen today for post-op.    Diagnoses and all orders for this visit:    Postoperative examination    Other orders  -     rivaroxaban (XARELTO) 20 MG tablet; Take 1 tablet by mouth Daily With Dinner.              "

## 2020-11-25 PROBLEM — D68.59 HYPERCOAGULABLE STATE (HCC): Status: ACTIVE | Noted: 2019-09-28

## 2020-11-30 ENCOUNTER — OFFICE VISIT (OUTPATIENT)
Dept: OBSTETRICS AND GYNECOLOGY | Age: 40
End: 2020-11-30

## 2020-11-30 VITALS
WEIGHT: 204 LBS | SYSTOLIC BLOOD PRESSURE: 112 MMHG | BODY MASS INDEX: 32.78 KG/M2 | DIASTOLIC BLOOD PRESSURE: 78 MMHG | HEIGHT: 66 IN

## 2020-11-30 DIAGNOSIS — Z01.419 ENCOUNTER FOR GYNECOLOGICAL EXAMINATION (GENERAL) (ROUTINE) WITHOUT ABNORMAL FINDINGS: Primary | ICD-10-CM

## 2020-11-30 PROCEDURE — 99395 PREV VISIT EST AGE 18-39: CPT | Performed by: OBSTETRICS & GYNECOLOGY

## 2020-11-30 NOTE — PROGRESS NOTES
Routine Annual Visit    2020    Patient: Le Gooden          MR#:[id number]      Chief Complaint   Patient presents with   • Gynecologic Exam     AE today.  Tubal in 2019 after miscarriage.  Last pap 2019 = neg. Regular cycles. Two boys and girl, 15, 7 and 6.        History of Present Illness    40 y.o. female  who presents for annual exam.   Doing well, goes to gym 4 times weekly  Regular menses no issues  Not on blood thinner  BTL last year  UTD pap  No issues  Works at [employer]          Patient's last menstrual period was 2020.  Obstetric History:  OB History        6    Para   3    Term   3            AB   2    Living   3       SAB   1    TAB        Ectopic        Molar        Multiple        Live Births   3               Menstrual History:     Patient's last menstrual period was 2020.       Sexual History:       ________________________________________  Patient Active Problem List   Diagnosis   • Displacement of intrauterine contraceptive device   • Acute venous embolism and thrombosis of deep vessels of distal end of left lower extremity (CMS/[facility])   • Sterilization   • Hypercoagulable state (CMS/[facility])       Past Medical History:   Diagnosis Date   • Anemia    • Anxiety and depression    • DVT (deep venous thrombosis) (CMS/[facility]) 2019    RIGHT LEG        BEING FOLLOWED BY DR. RENO   • DVT (deep venous thrombosis) (CMS/[facility]) 2019    left   • Enlarged tonsils    • IUD migration     IUD REMOVED  3- 2018   • Migraine        Past Surgical History:   Procedure Laterality Date   • D&C HYSTEROSCOPY N/A 3/15/2018    Procedure: Hysteroscopy with removal of IUD;  Surgeon: Iglesia Hawkins MD;  Location: [facility];  Service: Obstetrics/Gynecology   • TUBAL COAGULATION LAPAROSCOPIC Bilateral 2019    Procedure: LAPAROSCOPIC TUBAL LIGATION;  Surgeon: Jolene Wing MD;  Location: [facility] OR;  Service: Obstetrics/Gynecology   • WISDOM TOOTH EXTRACTION   "2011       Social History     Tobacco Use   Smoking Status Never Smoker   Smokeless Tobacco Never Used       currently has no medications in their medication list.  ________________________________________    Current contraception: tubal ligation  History of abnormal Pap smear: no  Family history of Breast cancer: no        The following portions of the patient's history were reviewed and updated as appropriate: allergies, current medications, past family history, past medical history, past social history, past surgical history and problem list.    Review of Systems    Pertinent items are noted in HPI.     Objective   Physical Exam    /78   Ht 167.6 cm (66\")   Wt 92.5 kg (204 lb)   LMP 11/08/2020   BMI 32.93 kg/m²    BP Readings from Last 3 Encounters:   11/30/20 112/78   11/20/19 128/76   11/06/19 129/84      Wt Readings from Last 3 Encounters:   11/30/20 92.5 kg (204 lb)   11/20/19 94.8 kg (209 lb)   11/06/19 95.3 kg (210 lb)      BMI: Estimated body mass index is 32.93 kg/m² as calculated from the following:    Height as of this encounter: 167.6 cm (66\").    Weight as of this encounter: 92.5 kg (204 lb).      General:   alert, appears stated age and cooperative   Abdomen: soft, non-tender, without masses or organomegaly   Breast: inspection negative, no nipple discharge or bleeding, no masses or nodularity palpable   Vulva: normal   Vagina: normal mucosa   Cervix: no cervical motion tenderness and no lesions   Uterus: normal size, mobile or non-tender   Adnexa: no mass, fullness, tenderness     Assessment:    1. Normal annual exam   Assessment     ICD-10-CM ICD-9-CM   1. Encounter for gynecological examination (general) (routine) without abnormal findings  Z01.419 V72.31     Plan:    Plan       []  PAP done  []  Labs:   []  GC/Chl/TV          Diagnoses and all orders for this visit:    1. Encounter for gynecological examination (general) (routine) without abnormal findings " (Primary)            Counseling:  --Nutrition: Stressed importance of moderation and caloric balance, stressed fresh fruit and vegetables  --Exercise: Stressed the importance of regular exercise. 3-5 times weekly       --Discussed pap smear screening recommendations

## 2021-12-03 ENCOUNTER — OFFICE VISIT (OUTPATIENT)
Dept: OBSTETRICS AND GYNECOLOGY | Age: 41
End: 2021-12-03

## 2021-12-03 VITALS
DIASTOLIC BLOOD PRESSURE: 78 MMHG | BODY MASS INDEX: 33.88 KG/M2 | HEIGHT: 66 IN | WEIGHT: 210.8 LBS | SYSTOLIC BLOOD PRESSURE: 128 MMHG

## 2021-12-03 DIAGNOSIS — Z01.419 ENCOUNTER FOR GYNECOLOGICAL EXAMINATION WITHOUT ABNORMAL FINDING: Primary | ICD-10-CM

## 2021-12-03 PROCEDURE — 3008F BODY MASS INDEX DOCD: CPT | Performed by: OBSTETRICS & GYNECOLOGY

## 2021-12-03 PROCEDURE — 99395 PREV VISIT EST AGE 18-39: CPT | Performed by: OBSTETRICS & GYNECOLOGY

## 2021-12-03 RX ORDER — IBUPROFEN 600 MG/1
600 TABLET ORAL
COMMUNITY
Start: 2021-08-29 | End: 2021-12-03

## 2021-12-03 RX ORDER — METHOCARBAMOL 750 MG/1
750 TABLET, FILM COATED ORAL
COMMUNITY
Start: 2021-08-29 | End: 2021-12-03

## 2021-12-03 NOTE — PROGRESS NOTES
Routine Annual Visit    12/3/2021    Patient: Le Gooden          MR#:[id number]      Chief Complaint   Patient presents with   • Gynecologic Exam     Last Pap 2019 (-)       History of Present Illness    41 y.o. female  who presents for annual exam.   Doing well, regular menses tolerable  Going to [employer] game tomorrow with BF and best friend  UTD pap  Getting regular exercise  BTL  Start mammos next year  No complaints or issues          Patient's last menstrual period was 2021 (exact date).  Obstetric History:  OB History        6    Para   3    Term   3            AB   2    Living   3       SAB   1    IAB        Ectopic        Molar        Multiple        Live Births   3               Menstrual History:     Patient's last menstrual period was 2021 (exact date).       Sexual History:       ________________________________________  Patient Active Problem List   Diagnosis   • Displacement of intrauterine contraceptive device   • Acute venous embolism and thrombosis of deep vessels of distal end of left lower extremity ([facility])   • Sterilization   • Hypercoagulable state (HCC)       Past Medical History:   Diagnosis Date   • Anemia    • Anxiety and depression    • DVT (deep venous thrombosis) ([facility]) 2019    RIGHT LEG        BEING FOLLOWED BY DR. RENO   • DVT (deep venous thrombosis) ([facility]) 2019    left   • Enlarged tonsils    • IUD migration     IUD REMOVED  3- 2018   • Migraine        Past Surgical History:   Procedure Laterality Date   • D & C HYSTEROSCOPY N/A 3/15/2018    Procedure: Hysteroscopy with removal of IUD;  Surgeon: Iglesia Hawkins MD;  Location: [facility] OR;  Service: Obstetrics/Gynecology   • TUBAL COAGULATION LAPAROSCOPIC Bilateral 2019    Procedure: LAPAROSCOPIC TUBAL LIGATION;  Surgeon: Jolene Wing MD;  Location: [facility] OR;  Service: Obstetrics/Gynecology   • WISDOM TOOTH EXTRACTION         Social History     Tobacco  "Use   Smoking Status Never Smoker   Smokeless Tobacco Never Used       currently has no medications in their medication list.  ________________________________________    Current contraception: tubal ligation  History of abnormal Pap smear: no  Family history of Breast cancer: no        The following portions of the patient's history were reviewed and updated as appropriate: allergies, current medications, past family history, past medical history, past social history, past surgical history and problem list.    Review of Systems    Pertinent items are noted in HPI.     Objective   Physical Exam    /78   Ht 167.6 cm (66\")   Wt 95.6 kg (210 lb 12.8 oz)   LMP 11/19/2021 (Exact Date)   Breastfeeding No   BMI 34.02 kg/m²    BP Readings from Last 3 Encounters:   12/03/21 128/78   11/30/20 112/78   11/20/19 128/76      Wt Readings from Last 3 Encounters:   12/03/21 95.6 kg (210 lb 12.8 oz)   11/30/20 92.5 kg (204 lb)   11/20/19 94.8 kg (209 lb)      BMI: Estimated body mass index is 34.02 kg/m² as calculated from the following:    Height as of this encounter: 167.6 cm (66\").    Weight as of this encounter: 95.6 kg (210 lb 12.8 oz).      General:   alert, appears stated age and cooperative   Abdomen: soft, non-tender, without masses or organomegaly   Breast: inspection negative, no nipple discharge or bleeding, no masses or nodularity palpable   Vulva: normal   Vagina: normal mucosa   Cervix: no cervical motion tenderness and no lesions   Uterus: normal size, mobile and non-tender   Adnexa: no mass, fullness, tenderness     Assessment:    1. Normal annual exam   Assessment     ICD-10-CM ICD-9-CM   1. Encounter for gynecological examination without abnormal finding  Z01.419 V72.31     Plan:    Plan       []  PAP done  []  Labs:   []  GC/Chl/TV          Diagnoses and all orders for this visit:    1. Encounter for gynecological examination without abnormal finding (Primary)            Counseling:  --Nutrition: " Stressed importance of moderation and caloric balance, stressed fresh fruit and vegetables  --Exercise: Stressed the importance of regular exercise. 3-5 times weekly       --Discussed pap smear screening recommendations

## 2023-01-09 ENCOUNTER — PROCEDURE VISIT (OUTPATIENT)
Dept: OBSTETRICS AND GYNECOLOGY | Age: 43
End: 2023-01-09
Payer: COMMERCIAL

## 2023-01-09 ENCOUNTER — OFFICE VISIT (OUTPATIENT)
Dept: OBSTETRICS AND GYNECOLOGY | Age: 43
End: 2023-01-09
Payer: COMMERCIAL

## 2023-01-09 VITALS
DIASTOLIC BLOOD PRESSURE: 74 MMHG | BODY MASS INDEX: 35.84 KG/M2 | WEIGHT: 223 LBS | SYSTOLIC BLOOD PRESSURE: 126 MMHG | HEIGHT: 66 IN

## 2023-01-09 DIAGNOSIS — Z12.4 SCREENING FOR CERVICAL CANCER: ICD-10-CM

## 2023-01-09 DIAGNOSIS — Z12.31 VISIT FOR SCREENING MAMMOGRAM: Primary | ICD-10-CM

## 2023-01-09 DIAGNOSIS — Z11.51 SCREENING FOR HPV (HUMAN PAPILLOMAVIRUS): ICD-10-CM

## 2023-01-09 DIAGNOSIS — Z01.419 ENCOUNTER FOR GYNECOLOGICAL EXAMINATION WITHOUT ABNORMAL FINDING: Primary | ICD-10-CM

## 2023-01-09 PROCEDURE — 77063 BREAST TOMOSYNTHESIS BI: CPT | Performed by: OBSTETRICS & GYNECOLOGY

## 2023-01-09 PROCEDURE — 99396 PREV VISIT EST AGE 40-64: CPT | Performed by: OBSTETRICS & GYNECOLOGY

## 2023-01-09 PROCEDURE — 77067 SCR MAMMO BI INCL CAD: CPT | Performed by: OBSTETRICS & GYNECOLOGY

## 2023-01-09 NOTE — PROGRESS NOTES
Routine Annual Visit    2023    Patient: Le Gooden          MR#:[id number]      Chief Complaint   Patient presents with   • Gynecologic Exam     Mammo @ 1:00, Last Pap 2019 (-), No Concerns at this time       History of Present Illness    42 y.o. female  who presents for annual exam.     mammo today  Due for pap  Menses regular no issues  Pelvic pain on right off and on , not severe, ovary palpable on exam ,will check GYN US  3 kids 17, 8,10 yo      Patient's last menstrual period was 2022 (within days).  Obstetric History:  OB History        6    Para   3    Term   3            AB   2    Living   3       SAB   1    IAB        Ectopic        Molar        Multiple        Live Births   3               Menstrual History:     Patient's last menstrual period was 2022 (within days).       Sexual History:       ________________________________________  Patient Active Problem List   Diagnosis   • Displacement of intrauterine contraceptive device   • Acute venous embolism and thrombosis of deep vessels of distal end of left lower extremity (HCC)   • Sterilization   • Hypercoagulable state (HCC)       Past Medical History:   Diagnosis Date   • Anemia    • Anxiety and depression    • DVT (deep venous thrombosis) ([facility]) 2019    RIGHT LEG        BEING FOLLOWED BY DR. RENO   • DVT (deep venous thrombosis) ([facility]) 2019    left   • Enlarged tonsils    • IUD migration     IUD REMOVED  3- 2018   • Migraine        Past Surgical History:   Procedure Laterality Date   • D & C HYSTEROSCOPY N/A 3/15/2018    Procedure: Hysteroscopy with removal of IUD;  Surgeon: Iglesia Hawkins MD;  Location: [facility];  Service: Obstetrics/Gynecology   • TUBAL COAGULATION LAPAROSCOPIC Bilateral 2019    Procedure: LAPAROSCOPIC TUBAL LIGATION;  Surgeon: Jolene Wing MD;  Location: [facility];  Service: Obstetrics/Gynecology   • WISDOM TOOTH EXTRACTION         Social  History     Tobacco Use   Smoking Status Never   Smokeless Tobacco Never       currently has no medications in their medication list.  ________________________________________    Current contraception: tubal ligation  History of abnormal Pap smear: no  Family history of Breast cancer: no  Family history of uterine or ovarian cancer: no  Family History of colon cancer/colon polyps: no  History of abnormal mammogram: no      The following portions of the patient's history were reviewed and updated as appropriate: allergies, current medications, past family history, past medical history, past social history, past surgical history and problem list.    Review of Systems    Pertinent items are noted in HPI.     Objective   Physical Exam    /74   Ht 167.6 cm (66\")   Wt 101 kg (223 lb)   LMP 12/14/2022 (Within Days)   BMI 35.99 kg/m²    BP Readings from Last 3 Encounters:   01/09/23 126/74   12/03/21 128/78   11/30/20 112/78      Wt Readings from Last 3 Encounters:   01/09/23 101 kg (223 lb)   12/03/21 95.6 kg (210 lb 12.8 oz)   11/30/20 92.5 kg (204 lb)      BMI: Estimated body mass index is 35.99 kg/m² as calculated from the following:    Height as of this encounter: 167.6 cm (66\").    Weight as of this encounter: 101 kg (223 lb).      General:   alert, appears stated age and cooperative   Abdomen: soft, non-tender, without masses or organomegaly   Breast: inspection negative, no nipple discharge or bleeding, no masses or nodularity palpable   Vulva: normal   Vagina: normal mucosa   Cervix: no cervical motion tenderness and no lesions   Uterus: normal size, mobile and non-tender   Adnexa: mild tender on right, palpable ovary, no mass on left     Assessment:    1. Normal annual exam   Assessment     ICD-10-CM ICD-9-CM   1. Encounter for gynecological examination without abnormal finding  Z01.419 V72.31   2. Screening for cervical cancer  Z12.4 V76.2   3. Screening for HPV (human papillomavirus)  Z11.51 V73.81      Plan:    Plan     [x]  Mammogram request made  [x]  PAP done  []  Labs:   []  GC/Chl/TV  []  DEXA scan   []  Referral for colonoscopy:       Diagnoses and all orders for this visit:    1. Encounter for gynecological examination without abnormal finding (Primary)    2. Screening for cervical cancer  -     IGP, Apt HPV,rfx 16 / 18,45    3. Screening for HPV (human papillomavirus)  -     IGP, Apt HPV,rfx 16 / 18,45      Check GYN US        Counseling:  --Nutrition: Stressed importance of moderation and caloric balance, stressed fresh fruit and vegetables  --Exercise: Stressed the importance of regular exercise. 3-5 times weekly   - Discussed screening mammogram recommendations.   --Discussed benefits of screening colonoscopy- age 45 unless FH  --Discussed pap smear screening recommendations

## 2023-01-13 LAB
CYTOLOGIST CVX/VAG CYTO: ABNORMAL
CYTOLOGY CVX/VAG DOC CYTO: ABNORMAL
CYTOLOGY CVX/VAG DOC THIN PREP: ABNORMAL
DX ICD CODE: ABNORMAL
HIV 1 & 2 AB SER-IMP: ABNORMAL
HPV I/H RISK 4 DNA CVX QL PROBE+SIG AMP: POSITIVE
HPV16 DNA CVX QL PROBE+SIG AMP: NEGATIVE
HPV18+45 E6+E7 MRNA CVX QL NAA+PROBE: NEGATIVE
OTHER STN SPEC: ABNORMAL
STAT OF ADQ CVX/VAG CYTO-IMP: ABNORMAL

## 2023-01-13 RX ORDER — METRONIDAZOLE 500 MG/1
500 TABLET ORAL 2 TIMES DAILY
Qty: 14 TABLET | Refills: 0 | Status: SHIPPED | OUTPATIENT
Start: 2023-01-13 | End: 2023-01-20

## 2023-01-19 ENCOUNTER — OFFICE VISIT (OUTPATIENT)
Dept: OBSTETRICS AND GYNECOLOGY | Age: 43
End: 2023-01-19
Payer: COMMERCIAL

## 2023-01-19 VITALS
SYSTOLIC BLOOD PRESSURE: 124 MMHG | WEIGHT: 224 LBS | DIASTOLIC BLOOD PRESSURE: 70 MMHG | HEIGHT: 66 IN | BODY MASS INDEX: 36 KG/M2

## 2023-01-19 DIAGNOSIS — R10.2 PELVIC PAIN: ICD-10-CM

## 2023-01-19 DIAGNOSIS — B97.7 HPV IN FEMALE: ICD-10-CM

## 2023-01-19 DIAGNOSIS — D25.1 INTRAMURAL LEIOMYOMA OF UTERUS: Primary | ICD-10-CM

## 2023-01-19 PROCEDURE — 76830 TRANSVAGINAL US NON-OB: CPT | Performed by: OBSTETRICS & GYNECOLOGY

## 2023-01-19 PROCEDURE — 99213 OFFICE O/P EST LOW 20 MIN: CPT | Performed by: OBSTETRICS & GYNECOLOGY

## 2023-01-19 NOTE — PROGRESS NOTES
GYN Visit    2023    Patient: Le Gooden          MR#:[id number]      Chief Complaint   Patient presents with   • Follow-up     U/S @ 12:30 for R pelvic pain, Last Pap 23 HPV +.       History of Present Illness    42 y.o. female  who presents for  Evaluation with US for pelvic pain  Also other concerns,   HPV + on pap, but pap neg - reviewed hpv disease and recommend repeat pap next year  Trich on pap- took flagyl, partner declines treatment and not a regular issue for her  Reviewed US- fibroids present but small, ovaries normal , no source of pain  Pain is usually fleeting and not severe        Patient's last menstrual period was 2022 (exact date).    ________________________________________  Patient Active Problem List   Diagnosis   • Displacement of intrauterine contraceptive device   • Acute venous embolism and thrombosis of deep vessels of distal end of left lower extremity ([facility])   • Sterilization   • Hypercoagulable state (HCC)       Past Medical History:   Diagnosis Date   • Anemia    • Anxiety and depression    • DVT (deep venous thrombosis) ([facility]) 2019    RIGHT LEG        BEING FOLLOWED BY DR. RENO   • DVT (deep venous thrombosis) ([facility]) 2019    left   • Enlarged tonsils    • IUD migration     IUD REMOVED  3- 2018   • Migraine        Past Surgical History:   Procedure Laterality Date   • D & C HYSTEROSCOPY N/A 3/15/2018    Procedure: Hysteroscopy with removal of IUD;  Surgeon: Iglesia Hawkins MD;  Location: [facility];  Service: Obstetrics/Gynecology   • TUBAL COAGULATION LAPAROSCOPIC Bilateral 2019    Procedure: LAPAROSCOPIC TUBAL LIGATION;  Surgeon: Jolene Wing MD;  Location: [facility];  Service: Obstetrics/Gynecology   • WISDOM TOOTH EXTRACTION         Social History     Tobacco Use   Smoking Status Never   Smokeless Tobacco Never       has a current medication list which includes the following prescription(s):  "metronidazole.  ________________________________________    Current contraception: tubal ligation      The following portions of the patient's history were reviewed and updated as appropriate: allergies, current medications, past family history, past medical history, past social history, past surgical history and problem list.    Review of Systems    Pertinent items are noted in HPI.     Objective   Physical Exam    /70   Ht 167.6 cm (66\")   Wt 102 kg (224 lb)   LMP 12/27/2022 (Exact Date)   BMI 36.15 kg/m²    BP Readings from Last 3 Encounters:   01/19/23 124/70   01/09/23 126/74   12/03/21 128/78      Wt Readings from Last 3 Encounters:   01/19/23 102 kg (224 lb)   01/09/23 101 kg (223 lb)   12/03/21 95.6 kg (210 lb 12.8 oz)      BMI: Estimated body mass index is 36.15 kg/m² as calculated from the following:    Height as of this encounter: 167.6 cm (66\").    Weight as of this encounter: 102 kg (224 lb).    Lungs: non labored breathing, no wheezing or tachpnea  Extremities: extremities normal, atraumatic, no cyanosis or edema  Skin: Skin color, texture, turgor normal. No rashes or lesions  Neurologic: Grossly normal  General:   alert, appears stated age and cooperative            US- vol 300 cc  18 mm lining ( right before menses, uniform)  Fibroid 2x2 cm                      Assessment:      Diagnoses and all orders for this visit:    1. Intramural leiomyoma of uterus (Primary)    2. Pelvic pain  -     US Non-ob Transvaginal    3. HPV in female    repeat pap 1` year  Recommend appt for [facility] for trich, pt will call for appt  US shows small fibroid but otherwise normal            "

## 2023-08-22 DIAGNOSIS — Z12.31 ENCOUNTER FOR SCREENING MAMMOGRAM FOR MALIGNANT NEOPLASM OF BREAST: Primary | ICD-10-CM

## 2023-09-20 RX ORDER — METRONIDAZOLE 500 MG/1
TABLET ORAL
Qty: 14 TABLET | Refills: 0 | Status: SHIPPED | OUTPATIENT
Start: 2023-09-20

## 2024-01-15 ENCOUNTER — HOSPITAL ENCOUNTER (OUTPATIENT)
Facility: HOSPITAL | Age: 44
Discharge: HOME | End: 2024-01-15
Admitting: OBSTETRICS & GYNECOLOGY
Payer: COMMERCIAL

## 2024-01-15 ENCOUNTER — OFFICE VISIT (OUTPATIENT)
Dept: OBSTETRICS AND GYNECOLOGY | Age: 44
End: 2024-01-15
Payer: COMMERCIAL

## 2024-01-15 VITALS
SYSTOLIC BLOOD PRESSURE: 112 MMHG | WEIGHT: 203 LBS | HEIGHT: 66 IN | DIASTOLIC BLOOD PRESSURE: 72 MMHG | BODY MASS INDEX: 32.62 KG/M2

## 2024-01-15 DIAGNOSIS — Z12.31 VISIT FOR SCREENING MAMMOGRAM: ICD-10-CM

## 2024-01-15 DIAGNOSIS — Z12.4 SCREENING FOR MALIGNANT NEOPLASM OF CERVIX: ICD-10-CM

## 2024-01-15 DIAGNOSIS — Z01.419 ENCOUNTER FOR GYNECOLOGICAL EXAMINATION WITHOUT ABNORMAL FINDING: Primary | ICD-10-CM

## 2024-01-15 DIAGNOSIS — Z01.419 WELL FEMALE EXAM WITH ROUTINE GYNECOLOGICAL EXAM: ICD-10-CM

## 2024-01-15 DIAGNOSIS — Z12.31 SCREENING MAMMOGRAM FOR BREAST CANCER: ICD-10-CM

## 2024-01-15 DIAGNOSIS — Z11.51 SCREENING FOR HUMAN PAPILLOMAVIRUS (HPV): ICD-10-CM

## 2024-01-15 PROCEDURE — 1160F RVW MEDS BY RX/DR IN RCRD: CPT | Performed by: OBSTETRICS & GYNECOLOGY

## 2024-01-15 PROCEDURE — 77063 BREAST TOMOSYNTHESIS BI: CPT

## 2024-01-15 PROCEDURE — 99396 PREV VISIT EST AGE 40-64: CPT | Performed by: OBSTETRICS & GYNECOLOGY

## 2024-01-15 PROCEDURE — 77067 SCR MAMMO BI INCL CAD: CPT

## 2024-01-15 PROCEDURE — 1159F MED LIST DOCD IN RCRD: CPT | Performed by: OBSTETRICS & GYNECOLOGY

## 2024-01-15 NOTE — PROGRESS NOTES
Routine Annual Visit    1/15/2024    Patient: Le Gooden          MR#:[id number]      Chief Complaint   Patient presents with    Gynecologic Exam     Annual Exam - last pap 23 neg/hpv (+), mammo today, pt has no complaints today       History of Present Illness    43 y.o. female  who presents for annual exam.     Periods ok, normal and regular, occ dysmenorrhea  Repeat pap due to HPV positive last year  Son is graduating from male high school  They went on a trip to [place] and had a great time recently  No other complaints  Mammogram today      Patient's last menstrual period was 2024 (exact date).  Obstetric History:  OB History          6    Para   3    Term   3            AB   2    Living   3         SAB   1    IAB        Ectopic        Molar        Multiple        Live Births   3               Menstrual History:     Patient's last menstrual period was 2024 (exact date).       Sexual History:       ________________________________________  Patient Active Problem List   Diagnosis    Displacement of intrauterine contraceptive device    Acute venous embolism and thrombosis of deep vessels of distal end of left lower extremity    Sterilization    Hypercoagulable state       Past Medical History:   Diagnosis Date    Anemia     Anxiety and depression     DVT (deep venous thrombosis) 2019    RIGHT LEG        BEING FOLLOWED BY DR. RENO    DVT (deep venous thrombosis) 2019    left    Enlarged tonsils     IUD migration     IUD REMOVED  3- 2018    Migraine        Past Surgical History:   Procedure Laterality Date    D & C HYSTEROSCOPY N/A 3/15/2018    Procedure: Hysteroscopy with removal of IUD;  Surgeon: Iglesia Hawkins MD;  Location: [facility];  Service: Obstetrics/Gynecology    TUBAL COAGULATION LAPAROSCOPIC Bilateral 2019    Procedure: LAPAROSCOPIC TUBAL LIGATION;  Surgeon: Jolene Wing MD;  Location: [facility] OR;  Service:  "Obstetrics/Gynecology    WISDOM TOOTH EXTRACTION  2011       Social History     Tobacco Use   Smoking Status Never   Smokeless Tobacco Never       has a current medication list which includes the following prescription(s): metronidazole.  ________________________________________    Current contraception: condoms  History of abnormal Pap smear: no, recent hpv  Family history of Breast cancer: no  Family history of uterine or ovarian cancer: yes - mat aunt  Family History of colon cancer/colon polyps: no  History of abnormal mammogram: no      The following portions of the patient's history were reviewed and updated as appropriate: allergies, current medications, past family history, past medical history, past social history, past surgical history, and problem list.    Review of Systems    Pertinent items are noted in HPI.     Objective   Physical Exam    /72   Ht 167.6 cm (66\")   Wt 92.1 kg (203 lb)   LMP 01/09/2024 (Exact Date)   BMI 32.77 kg/m²    BP Readings from Last 3 Encounters:   01/15/24 112/72   01/19/23 124/70   01/09/23 126/74      Wt Readings from Last 3 Encounters:   01/15/24 92.1 kg (203 lb)   01/19/23 102 kg (224 lb)   01/09/23 101 kg (223 lb)      BMI: Estimated body mass index is 32.77 kg/m² as calculated from the following:    Height as of this encounter: 167.6 cm (66\").    Weight as of this encounter: 92.1 kg (203 lb).      General:   alert, appears stated age, and cooperative   Abdomen: soft, non-tender, without masses or organomegaly   Breast: inspection negative, no nipple discharge or bleeding, no masses or nodularity palpable   Vulva: normal, Bartholin's, Urethra, Wilsey's normal   Vagina: normal mucosa   Cervix: no cervical motion tenderness and no lesions   Uterus: normal size, mobile, and non-tender   Adnexa: no mass, fullness, tenderness     Assessment:    1. Normal annual exam   Assessment     ICD-10-CM ICD-9-CM   1. Encounter for gynecological examination without abnormal " finding  Z01.419 V72.31   2. Well female exam with routine gynecological exam  Z01.419 V72.31   3. Screening for human papillomavirus (HPV)  Z11.51 V73.81   4. Screening for malignant neoplasm of cervix  Z12.4 V76.2   5. Screening mammogram for breast cancer  Z12.31 V76.12     Plan:    Plan     [x]  Mammogram request made  [x]  PAP done  []  Labs:   []  GC/Chl/TV  []  DEXA scan   []  Referral for colonoscopy:       Diagnoses and all orders for this visit:    1. Encounter for gynecological examination without abnormal finding (Primary)    2. Well female exam with routine gynecological exam  -     IGP, Apt HPV,rfx 16 / 18,45    3. Screening for human papillomavirus (HPV)  -     IGP, Apt HPV,rfx 16 / 18,45    4. Screening for malignant neoplasm of cervix  -     IGP, Apt HPV,rfx 16 / 18,45    5. Screening mammogram for breast cancer  -     Mammo Screening Digital Tomosynthesis Bilateral With CAD; Future            Counseling:  --Nutrition: Stressed importance of moderation and caloric balance, stressed fresh fruit and vegetables  --Exercise: Stressed the importance of regular exercise. 3-5 times weekly   - Discussed screening mammogram recommendations.   --Discussed benefits of screening colonoscopy- age 45 unless FH  --Discussed pap smear screening recommendations

## 2024-01-17 LAB
CYTOLOGIST CVX/VAG CYTO: NORMAL
CYTOLOGY CVX/VAG DOC CYTO: NORMAL
CYTOLOGY CVX/VAG DOC THIN PREP: NORMAL
DX ICD CODE: NORMAL
HIV 1 & 2 AB SER-IMP: NORMAL
HPV I/H RISK 4 DNA CVX QL PROBE+SIG AMP: NEGATIVE
OTHER STN SPEC: NORMAL
STAT OF ADQ CVX/VAG CYTO-IMP: NORMAL

## 2025-02-03 ENCOUNTER — HOSPITAL ENCOUNTER (OUTPATIENT)
Facility: HOSPITAL | Age: 45
Discharge: HOME | End: 2025-02-03
Admitting: OBSTETRICS & GYNECOLOGY
Payer: COMMERCIAL

## 2025-02-03 DIAGNOSIS — Z12.31 SCREENING MAMMOGRAM FOR BREAST CANCER: ICD-10-CM

## 2025-02-03 PROCEDURE — 77067 SCR MAMMO BI INCL CAD: CPT

## 2025-02-03 PROCEDURE — 77063 BREAST TOMOSYNTHESIS BI: CPT

## 2025-03-13 ENCOUNTER — OFFICE VISIT (OUTPATIENT)
Dept: OBSTETRICS AND GYNECOLOGY | Age: 45
End: 2025-03-13
Payer: COMMERCIAL

## 2025-03-13 VITALS
WEIGHT: 207 LBS | BODY MASS INDEX: 33.27 KG/M2 | SYSTOLIC BLOOD PRESSURE: 116 MMHG | HEIGHT: 66 IN | DIASTOLIC BLOOD PRESSURE: 78 MMHG

## 2025-03-13 DIAGNOSIS — D25.1 INTRAMURAL LEIOMYOMA OF UTERUS: ICD-10-CM

## 2025-03-13 DIAGNOSIS — N92.0 MENORRHAGIA WITH REGULAR CYCLE: ICD-10-CM

## 2025-03-13 DIAGNOSIS — Z01.419 ENCOUNTER FOR GYNECOLOGICAL EXAMINATION WITHOUT ABNORMAL FINDING: Primary | ICD-10-CM

## 2025-03-13 DIAGNOSIS — D50.0 IRON DEFICIENCY ANEMIA DUE TO CHRONIC BLOOD LOSS: ICD-10-CM

## 2025-03-13 PROCEDURE — 99396 PREV VISIT EST AGE 40-64: CPT | Performed by: OBSTETRICS & GYNECOLOGY

## 2025-03-13 NOTE — PROGRESS NOTES
Routine Annual Visit    3/13/2025    Patient: Le Gooden          MR#:[id number]      Chief Complaint   Patient presents with    Gynecologic Exam     Annual Exam - last pap 1/15/24 neg, mammo 2/3/25, pt stating she is a little late starting her menses but has had a tubal & would like to discuss possible perimenopause       History of Present Illness    44 y.o. female  who presents for annual exam.     Patient is feeling well  Mammogram is up-to-date  Pap is up-to-date  She is having some irregularity in her cycle  She is late on her period  Usually its somewhat regular  She does have heavy cycles  She has an occasional hot flash and occasional night sweats  She has had recent lab work done  She is anemic  She has known fibroids  I discussed options to control her cycles and to help with her anemia        Patient's last menstrual period was 2025 (exact date).  Obstetric History:  OB History          6    Para   3    Term   3            AB   2    Living   3         SAB   1    IAB        Ectopic        Molar        Multiple        Live Births   3               Menstrual History:     Patient's last menstrual period was 2025 (exact date).       Sexual History:       ________________________________________  Patient Active Problem List   Diagnosis    Displacement of intrauterine contraceptive device    Acute venous embolism and thrombosis of deep vessels of distal end of left lower extremity    Sterilization    Hypercoagulable state       Past Medical History:   Diagnosis Date    Anemia     Anxiety and depression     DVT (deep venous thrombosis) 2019    RIGHT LEG        BEING FOLLOWED BY DR. RENO    DVT (deep venous thrombosis) 2019    left    Enlarged tonsils     IUD migration     IUD REMOVED  3- 2018    Migraine        Past Surgical History:   Procedure Laterality Date    D & C HYSTEROSCOPY N/A 3/15/2018    Procedure: Hysteroscopy with removal of IUD;  Surgeon: Iglesia  "Rudy Hawkins MD;  Location: [facility];  Service: Obstetrics/Gynecology    TUBAL COAGULATION LAPAROSCOPIC Bilateral 11/6/2019    Procedure: LAPAROSCOPIC TUBAL LIGATION;  Surgeon: Jolene Wing MD;  Location: [facility];  Service: Obstetrics/Gynecology    WISDOM TOOTH EXTRACTION  2011       Social History     Tobacco Use   Smoking Status Never   Smokeless Tobacco Never       has a current medication list which includes the following prescription(s): vitamin d and metronidazole.  ________________________________________    Current contraception: tubal ligation  History of abnormal Pap smear: no  Family history of Breast cancer: no  Family history of uterine or ovarian cancer:maternal aunt  Family History of colon cancer/colon polyps: no  History of abnormal mammogram: no      The following portions of the patient's history were reviewed and updated as appropriate: allergies, current medications, past family history, past medical history, past social history, past surgical history, and problem list.    Review of Systems    Pertinent items are noted in HPI.     Objective   Physical Exam    /78 (BP Location: Left arm, Patient Position: Sitting)   Ht 167.6 cm (66\")   Wt 93.9 kg (207 lb)   LMP 02/07/2025 (Exact Date)   BMI 33.41 kg/m²    BP Readings from Last 3 Encounters:   03/13/25 116/78   01/15/24 112/72   01/19/23 124/70      Wt Readings from Last 3 Encounters:   03/13/25 93.9 kg (207 lb)   01/15/24 92.1 kg (203 lb)   01/19/23 102 kg (224 lb)      BMI: Estimated body mass index is 33.41 kg/m² as calculated from the following:    Height as of this encounter: 167.6 cm (66\").    Weight as of this encounter: 93.9 kg (207 lb).      General:   alert, appears stated age, and cooperative   Abdomen: soft, non-tender, without masses or organomegaly   Breast: inspection negative, no nipple discharge or bleeding, no masses or nodularity palpable   Vulva: normal, Bartholin's, Urethra, [place]'s normal   Vagina: " normal mucosa   Cervix: no cervical motion tenderness and no lesions   Uterus: bulky, mobile, and non-tender, ?18 week size   Adnexa: no mass, fullness, tenderness     Assessment:    1. Normal annual exam   Assessment     ICD-10-CM ICD-9-CM   1. Encounter for gynecological examination without abnormal finding  Z01.419 V72.31   2. Intramural leiomyoma of uterus  D25.1 218.1   3. Iron deficiency anemia due to chronic blood loss  D50.0 280.0   4. Menorrhagia with regular cycle  N92.0 626.2     Plan:    Plan     []  Mammogram request made  []  PAP done  []  Labs:   []  GC/Chl/TV  []  DEXA scan   []  Referral for colonoscopy:       Diagnoses and all orders for this visit:    1. Encounter for gynecological examination without abnormal finding (Primary)    2. Intramural leiomyoma of uterus    3. Iron deficiency anemia due to chronic blood loss    4. Menorrhagia with regular cycle      Schedule GYN ultrasound  Highly suspect uterus has grown since her last ultrasound  Discussed endometrial ablation and also hysterectomy  We will discuss options at length at her ultrasound appointment.  Patient does take an iron supplement      Counseling:  --Nutrition: Stressed importance of moderation and caloric balance, stressed fresh fruit and vegetables  --Exercise: Stressed the importance of regular exercise. 3-5 times weekly   - Discussed screening mammogram recommendations.   --Discussed benefits of screening colonoscopy- age 45 unless FH  --Discussed pap smear screening recommendations

## 2025-03-27 ENCOUNTER — OFFICE VISIT (OUTPATIENT)
Dept: OBSTETRICS AND GYNECOLOGY | Age: 45
End: 2025-03-27
Payer: COMMERCIAL

## 2025-03-27 VITALS
SYSTOLIC BLOOD PRESSURE: 124 MMHG | BODY MASS INDEX: 33.27 KG/M2 | WEIGHT: 207 LBS | HEIGHT: 66 IN | DIASTOLIC BLOOD PRESSURE: 82 MMHG

## 2025-03-27 DIAGNOSIS — N92.0 MENORRHAGIA WITH REGULAR CYCLE: ICD-10-CM

## 2025-03-27 DIAGNOSIS — D25.1 INTRAMURAL LEIOMYOMA OF UTERUS: ICD-10-CM

## 2025-03-27 DIAGNOSIS — Z01.812 PRE-PROCEDURE LAB EXAM: Primary | ICD-10-CM

## 2025-03-27 DIAGNOSIS — D50.0 IRON DEFICIENCY ANEMIA DUE TO CHRONIC BLOOD LOSS: ICD-10-CM

## 2025-03-27 DIAGNOSIS — R93.5 ABNORMAL ULTRASOUND OF ENDOMETRIUM: ICD-10-CM

## 2025-03-27 LAB
B-HCG UR QL: NEGATIVE
EXPIRATION DATE: NORMAL
INTERNAL NEGATIVE CONTROL: NORMAL
INTERNAL POSITIVE CONTROL: NORMAL
Lab: NORMAL

## 2025-03-27 NOTE — PROGRESS NOTES
GYN Visit    3/27/2025    Patient: Le Gooden          MR#:[id number]      Chief Complaint   Patient presents with    Follow-up     Gyn F/u & US - Pt in office 3/13/25 & c/o irregular heavy menses, hx of fibroids, US today to evaluate, Discuss options going forward       History of Present Illness    44 y.o. female  who presents for follow-up to menorrhagia anemia and fibroid uterus    Patient has not had a period since February  Ultrasound was done today  Uterine volume is 416 cc  Her lining looks quite thick at 29 mm  Patient was consented for endometrial biopsy today  I was unable to pass the Pipelle passed the inner os  Patient was not tolerating the procedure and we abandon the procedure  I discussed hysterectomy with the patient  I also discussed options of hysteroscopy D&C, Depo-Lupron,  Patient is interested in a hysterectomy and I think this is the most complete treatment for her  At this point I feel abdominal hysterectomy would be most straightforward  I did offer Depo-Lupron to shrink the fibroids and then proceed with a possible laparoscopic approach  The patient declines Depo-Lupron  She has had a history of DVT in the past and cannot take other hormones  She does not want to do a hysterectomy anytime soon and would rather wait till after her birthday        Patient's last menstrual period was 2025 (exact date).    ________________________________________  Patient Active Problem List   Diagnosis    Displacement of intrauterine contraceptive device    Acute venous embolism and thrombosis of deep vessels of distal end of left lower extremity    Sterilization    Hypercoagulable state       Past Medical History:   Diagnosis Date    Anemia     Anxiety and depression     DVT (deep venous thrombosis) 2019    RIGHT LEG        BEING FOLLOWED BY DR. RENO    DVT (deep venous thrombosis) 2019    left    Enlarged tonsils     IUD migration     IUD REMOVED  3- 2018    Migraine   "      Past Surgical History:   Procedure Laterality Date    D & C HYSTEROSCOPY N/A 3/15/2018    Procedure: Hysteroscopy with removal of IUD;  Surgeon: Iglesia Hawkins MD;  Location: [facility];  Service: Obstetrics/Gynecology    TUBAL COAGULATION LAPAROSCOPIC Bilateral 11/6/2019    Procedure: LAPAROSCOPIC TUBAL LIGATION;  Surgeon: Jolene Wing MD;  Location: [facility];  Service: Obstetrics/Gynecology    WISDOM TOOTH EXTRACTION  2011       Social History     Tobacco Use   Smoking Status Never   Smokeless Tobacco Never       has a current medication list which includes the following prescription(s): vitamin d and metronidazole.  ________________________________________    Current contraception: none      The following portions of the patient's history were reviewed and updated as appropriate: allergies, current medications, past family history, past medical history, past social history, past surgical history, and problem list.    Review of Systems    Pertinent items are noted in HPI.     Objective   Physical Exam    /82 (BP Location: Left arm, Patient Position: Sitting)   Ht 167.6 cm (66\")   Wt 93.9 kg (207 lb)   LMP 02/07/2025 (Exact Date)   BMI 33.41 kg/m²    BP Readings from Last 3 Encounters:   03/27/25 124/82   03/13/25 116/78   01/15/24 112/72      Wt Readings from Last 3 Encounters:   03/27/25 93.9 kg (207 lb)   03/13/25 93.9 kg (207 lb)   01/15/24 92.1 kg (203 lb)      BMI: Estimated body mass index is 33.41 kg/m² as calculated from the following:    Height as of this encounter: 167.6 cm (66\").    Weight as of this encounter: 93.9 kg (207 lb).    Lungs: non labored breathing, no wheezing or tachpnea  Extremities: extremities normal, atraumatic, no cyanosis or edema  Skin: Skin color, texture, turgor normal. No rashes or lesions  Neurologic: Grossly normal  General:   alert, appears stated age, and cooperative   Abdomen: soft, non-tender, without masses or organomegaly       Vulva: " normal, Bartholin's, Urethra, [place]'s normal   Vagina: normal mucosa   Cervix: no cervical motion tenderness and no lesions   Uterus: bulky, mobile, non-tender, and 16 week size   Adnexa: no mass, fullness, tenderness     Endometrial Biopsy Procedure Note    Pre-operative Diagnosis: menorrhagia    Post-operative Diagnosis: same    Indications: abnormal uterine bleeding    Procedure Details    Urine pregnancy test was done and was NEGATIVE .  The risks (including infection, bleeding, pain, and uterine perforation) and benefits of the procedure were explained to the patient and Written informed consent was obtained.  Antibiotic prophylaxis against endocarditis was not indicated.     The patient was placed in the dorsal lithotomy position.  Bimanual exam showed the uterus to be in the neutral position.  A Graves' speculum inserted in the vagina, and the cervix prepped with povidone iodine.  Endocervical curettage with a Kevorkian curette was not performed.     A sharp tenaculum was applied to the anterior lip of the cervix for stabilization.  A sterile uterine sound was used to sound the uterus to a depth of 2cm.    I was unable to advance the Pipelle.  I did try the blue dilator which actually did go through however the Pipelle did not.  I believe there is an unseen obstruction that the Pipelle is hitting on.  The patient at this point was not tolerating the exam very well and we canceled the biopsy.    Condition:  Stable    Complications:  None  Patient tolerated the procedure with difficulty.    Plan:    The patient was advised to call for any fever or for prolonged or severe pain or bleeding. She was advised to use OTC ibuprofen as needed for mild to moderate pain. She was advised to avoid vaginal intercourse for 48 hours or until the bleeding has completely stopped.    Attending Physician Documentation:  I was present for the entire procedure.   Assessment:      Diagnoses and all orders for this visit:    1.  Pre-procedure lab exam (Primary)  -     POC Pregnancy, Urine    2. Menorrhagia with regular cycle    3. Intramural leiomyoma of uterus    4. Iron deficiency anemia due to chronic blood loss    5. Abnormal ultrasound of endometrium  -     Endometrial Biopsy      We were unable to get an endometrial biopsy  Her lining is quite thickened at 29 mm but she has also skipped a period  I suspect her next period will be heavy  We will do a short-term follow-up ultrasound to check her lining again.  We could try the endometrial biopsy again if indicated or schedule a hysteroscopy D&C to get tissue sample.  With her age and fibroids I did not suspect endometrial hyperplasia  I do recommend hysterectomy  Patient does not want to do a hysterectomy in the near future but prefers to do it in the fall 2025  She cannot take any hormonal manipulation due to her history of DVT  For now short-term follow-up in about 6 weeks to recheck the lining.  If the lining is is still abnormal we may proceed with a hysteroscopy D&C.  Patient is agreeable to this plan

## 2025-05-12 ENCOUNTER — OFFICE VISIT (OUTPATIENT)
Dept: OBSTETRICS AND GYNECOLOGY | Age: 45
End: 2025-05-12
Payer: COMMERCIAL

## 2025-05-12 VITALS
DIASTOLIC BLOOD PRESSURE: 82 MMHG | HEIGHT: 66 IN | WEIGHT: 203 LBS | BODY MASS INDEX: 32.62 KG/M2 | SYSTOLIC BLOOD PRESSURE: 124 MMHG

## 2025-05-12 DIAGNOSIS — D21.9 FIBROIDS: ICD-10-CM

## 2025-05-12 DIAGNOSIS — N92.0 MENORRHAGIA WITH REGULAR CYCLE: ICD-10-CM

## 2025-05-12 DIAGNOSIS — D50.0 IRON DEFICIENCY ANEMIA DUE TO CHRONIC BLOOD LOSS: ICD-10-CM

## 2025-05-12 DIAGNOSIS — D25.1 INTRAMURAL LEIOMYOMA OF UTERUS: Primary | ICD-10-CM

## 2025-05-12 RX ORDER — GABAPENTIN 100 MG/1
600 CAPSULE ORAL ONCE
OUTPATIENT
Start: 2025-05-12 | End: 2025-05-12

## 2025-05-12 RX ORDER — SCOPOLAMINE 1 MG/3D
1 PATCH, EXTENDED RELEASE TRANSDERMAL CONTINUOUS
OUTPATIENT
Start: 2025-05-12 | End: 2025-05-15

## 2025-05-12 RX ORDER — SODIUM CHLORIDE 9 MG/ML
40 INJECTION, SOLUTION INTRAVENOUS AS NEEDED
OUTPATIENT
Start: 2025-05-12

## 2025-05-12 RX ORDER — SODIUM CHLORIDE 0.9 % (FLUSH) 0.9 %
10 SYRINGE (ML) INJECTION EVERY 12 HOURS SCHEDULED
OUTPATIENT
Start: 2025-05-12

## 2025-05-12 RX ORDER — LEUPROLIDE ACETATE 3.75 MG
3.75 KIT INTRAMUSCULAR
Qty: 1 EACH | Refills: 3 | COMMUNITY
Start: 2025-05-12 | End: 2025-05-12

## 2025-05-12 RX ORDER — SODIUM CHLORIDE 0.9 % (FLUSH) 0.9 %
10 SYRINGE (ML) INJECTION AS NEEDED
OUTPATIENT
Start: 2025-05-12

## 2025-05-12 RX ORDER — ACETAMINOPHEN 500 MG
1000 TABLET ORAL ONCE
OUTPATIENT
Start: 2025-05-12 | End: 2025-05-12

## 2025-05-12 NOTE — PROGRESS NOTES
GYN Visit    2025    Patient: Le Gooden          MR#:[id number]      Chief Complaint   Patient presents with    Follow-up     Gyn F/u & US - last seen in office 3/27/25, 6 week f/u to recheck lining          History of Present Illness    44 y.o. female  who presents for follow-up problem visit    Patient has large fibroid uterus and menorrhagia  She was not terribly anemic just mildly so  She is here today as a follow-up ultrasound due to her very thick lining  She is actually due for a period again  Her last menstrual period was   Ultrasound was done reviewed  She still has large fibroids but they have not grown  Her lining is much improved at 15 mm and looks uniform  We discussed hysterectomy and I think that we can do her hysterectomy laparoscopically  Patient does not want to do it until November  I recommend that we treat her with Depo-Lupron until her surgery  This will make the surgery easier shrink the fibroids and also make sure she does not get anemic  Patient is agreeable  Risk benefits and alternatives and side effects of Depo-Lupron were discussed        Patient's last menstrual period was 2025 (exact date).    ________________________________________  Patient Active Problem List   Diagnosis    Displacement of intrauterine contraceptive device    Acute venous embolism and thrombosis of deep vessels of distal end of left lower extremity    Sterilization    Hypercoagulable state    Intramural leiomyoma of uterus    Menorrhagia with regular cycle       Past Medical History:   Diagnosis Date    Anemia     Anxiety and depression     DVT (deep venous thrombosis) 2019    RIGHT LEG        BEING FOLLOWED BY DR. RENO    DVT (deep venous thrombosis) 2019    left    Enlarged tonsils     IUD migration     IUD REMOVED  3- 2018    Migraine        Past Surgical History:   Procedure Laterality Date    D & C HYSTEROSCOPY N/A 03/15/2018    Procedure: Hysteroscopy with removal of  "IUD;  Surgeon: Iglesia Hawkins MD;  Location: [facility];  Service: Obstetrics/Gynecology    D & C WITH SUCTION      DILATATION AND CURETTAGE      TUBAL ABDOMINAL LIGATION      TUBAL COAGULATION LAPAROSCOPIC Bilateral 11/06/2019    Procedure: LAPAROSCOPIC TUBAL LIGATION;  Surgeon: Jolene Wing MD;  Location: [facility];  Service: Obstetrics/Gynecology    WISDOM TOOTH EXTRACTION  2011       Social History     Tobacco Use   Smoking Status Never   Smokeless Tobacco Never       has a current medication list which includes the following prescription(s): vitamin d and metronidazole.  ________________________________________    Current contraception: tubal ligation      The following portions of the patient's history were reviewed and updated as appropriate: allergies, current medications, past family history, past medical history, past social history, past surgical history, and problem list.    Review of Systems    Pertinent items are noted in HPI.     Objective   Physical Exam    /82 (BP Location: Left arm, Patient Position: Sitting)   Ht 167.6 cm (66\")   Wt 92.1 kg (203 lb)   LMP 04/22/2025 (Exact Date)   BMI 32.77 kg/m²    BP Readings from Last 3 Encounters:   05/12/25 124/82   03/27/25 124/82   03/13/25 116/78      Wt Readings from Last 3 Encounters:   05/12/25 92.1 kg (203 lb)   03/27/25 93.9 kg (207 lb)   03/13/25 93.9 kg (207 lb)      BMI: Estimated body mass index is 32.77 kg/m² as calculated from the following:    Height as of this encounter: 167.6 cm (66\").    Weight as of this encounter: 92.1 kg (203 lb).    Lungs: non labored breathing, no wheezing or tachpnea  Extremities: extremities normal, atraumatic, no cyanosis or edema  Skin: Skin color, texture, turgor normal. No rashes or lesions  Neurologic: Grossly normal  General:   alert, appears stated age, and cooperative            See ultrasound report, volume 336 cc, lining is 15.27 mm, length of the uterus is 12 cm, right follicle " 3.5 cm, 3 fibroids were seen measuring 3, 4, 1.75 cm, stable from previous ultrasound, improved lining                     Assessment:      Diagnoses and all orders for this visit:    1. Intramural leiomyoma of uterus (Primary)  -     sodium chloride 0.9 % flush 10 mL  -     sodium chloride 0.9 % flush 10 mL  -     sodium chloride 0.9 % infusion 40 mL  -     Case Request; Standing  -     ceFAZolin (ANCEF) 2,000 mg in sodium chloride 0.9 % 100 mL IVPB  -     acetaminophen (TYLENOL) tablet 1,000 mg  -     gabapentin (NEURONTIN) capsule 600 mg  -     scopolamine patch 1 mg/72 hr  -     Case Request  -     leuprolide (LUPRON) injection 3.75 mg    2. Fibroids  -     leuprolide (LUPRON) injection 3.75 mg    3. Menorrhagia with regular cycle  -     sodium chloride 0.9 % flush 10 mL  -     sodium chloride 0.9 % flush 10 mL  -     sodium chloride 0.9 % infusion 40 mL  -     Case Request; Standing  -     ceFAZolin (ANCEF) 2,000 mg in sodium chloride 0.9 % 100 mL IVPB  -     acetaminophen (TYLENOL) tablet 1,000 mg  -     gabapentin (NEURONTIN) capsule 600 mg  -     scopolamine patch 1 mg/72 hr  -     Case Request  -     leuprolide (LUPRON) injection 3.75 mg    4. Iron deficiency anemia due to chronic blood loss  -     leuprolide (LUPRON) injection 3.75 mg    Other orders  -     Discontinue: leuprolide (Lupron [place], 1-Month,) 3.75 MG injection; Inject 3.75 mg into the appropriate muscle as directed by prescriber Every 28 (Twenty-Eight) Days.  Dispense: 1 each; Refill: 3  -     Code Status and Medical Interventions: CPR (Attempt to Resuscitate); Full Support; Standing  -     Follow Anesthesia Guidelines / Protocol; Future  -     Follow Anesthesia Guidelines / Protocol; Standing  -     Chlorhexidine Skin Prep; Future  -     Insert Peripheral IV; Standing  -     Saline Lock & Maintain IV Access; Standing  -     Place Sequential Compression Device; Standing  -     Maintain Sequential Compression Device; Standing        Recommend  Rc-Lupron until surgery  Surgery scheduled in November  Laparoscopic hysterectomy with bilateral salpingectomy is planned

## 2025-05-12 NOTE — PROGRESS NOTES
Routine Annual Visit    2025    Patient: Le Gooden          MR#:[id number]      Chief Complaint   Patient presents with    Follow-up     Gyn F/u & US - last seen in office 3/27/25, 6 week f/u to recheck lining          History of Present Illness    44 y.o. female  who presents for annual exam.           Patient's last menstrual period was 2025 (exact date).  Obstetric History:  OB History          6    Para   3    Term   3            AB   2    Living   3         SAB   1    IAB        Ectopic        Molar        Multiple        Live Births   3               Menstrual History:     Patient's last menstrual period was 2025 (exact date).       Sexual History:       ________________________________________  Patient Active Problem List   Diagnosis    Displacement of intrauterine contraceptive device    Acute venous embolism and thrombosis of deep vessels of distal end of left lower extremity    Sterilization    Hypercoagulable state    Intramural leiomyoma of uterus    Menorrhagia with regular cycle       Past Medical History:   Diagnosis Date    Anemia     Anxiety and depression     DVT (deep venous thrombosis) 2019    RIGHT LEG        BEING FOLLOWED BY DR. RENO    DVT (deep venous thrombosis) 2019    left    Enlarged tonsils     IUD migration     IUD REMOVED  3- 2018    Migraine        Past Surgical History:   Procedure Laterality Date    D & C HYSTEROSCOPY N/A 03/15/2018    Procedure: Hysteroscopy with removal of IUD;  Surgeon: Iglesia Hawkins MD;  Location: [facility];  Service: Obstetrics/Gynecology    D & C WITH SUCTION      DILATATION AND CURETTAGE      TUBAL ABDOMINAL LIGATION      TUBAL COAGULATION LAPAROSCOPIC Bilateral 2019    Procedure: LAPAROSCOPIC TUBAL LIGATION;  Surgeon: Jolene Wing MD;  Location: [facility] OR;  Service: Obstetrics/Gynecology    WISDOM TOOTH EXTRACTION         Social History     Tobacco Use   Smoking Status  "Never   Smokeless Tobacco Never       has a current medication list which includes the following prescription(s): vitamin d, lupron depot (1-month), and metronidazole.  ________________________________________    Current contraception: {contraceptive method:5051}  History of abnormal Pap smear: {yes***/no:38203}  Family history of Breast cancer:   Family history of uterine or ovarian cancer: {yes***/no:74704}  Family History of colon cancer/colon polyps: {yes**/no:14210}  History of abnormal mammogram: {yes***/no:14058}      {Common ambulatory SmartLinks:03291}    Review of Systems    {ROS - COMPLETE + GYN:9147159321}     Objective   Physical Exam    /82 (BP Location: Left arm, Patient Position: Sitting)   Ht 167.6 cm (66\")   Wt 92.1 kg (203 lb)   LMP 04/22/2025 (Exact Date)   BMI 32.77 kg/m²    BP Readings from Last 3 Encounters:   05/12/25 124/82   03/27/25 124/82   03/13/25 116/78      Wt Readings from Last 3 Encounters:   05/12/25 92.1 kg (203 lb)   03/27/25 93.9 kg (207 lb)   03/13/25 93.9 kg (207 lb)      BMI: Estimated body mass index is 32.77 kg/m² as calculated from the following:    Height as of this encounter: 167.6 cm (66\").    Weight as of this encounter: 92.1 kg (203 lb).      General:   {gen appearance:67469::\"alert\",\"appears stated age\",\"cooperative\"}   Abdomen: {abd exam:55506::\"soft, non-tender, without masses or organomegaly\"}   Breast: {exam; breast female:1202::\"inspection negative, no nipple discharge or bleeding, no masses or nodularity palpable\"}   Vulva: {vulva exam:55659}   Vagina: {vaginal exam:53609}   Cervix: {cervix exam:78585}   Uterus: {exam; uterus:92050::\"normal size\",\"non-tender\"}   Adnexa: {exam; adnexa:86955::\"normal adnexa\"}     Assessment:    1. Normal annual exam   Assessment     ICD-10-CM ICD-9-CM   1. Intramural leiomyoma of uterus  D25.1 218.1   2. Fibroids  D21.9 215.9   3. Menorrhagia with regular cycle  N92.0 626.2   4. Iron deficiency anemia due to chronic " blood loss  D50.0 280.0     Plan:    Plan     []  Mammogram request made  []  PAP done  []  Labs:   []  GC/Chl/TV  []  DEXA scan   []  Referral for colonoscopy:       Diagnoses and all orders for this visit:    1. Intramural leiomyoma of uterus (Primary)  -     sodium chloride 0.9 % flush 10 mL  -     sodium chloride 0.9 % flush 10 mL  -     sodium chloride 0.9 % infusion 40 mL  -     Case Request; Standing  -     ceFAZolin (ANCEF) 2,000 mg in sodium chloride 0.9 % 100 mL IVPB  -     acetaminophen (TYLENOL) tablet 1,000 mg  -     gabapentin (NEURONTIN) capsule 600 mg  -     scopolamine patch 1 mg/72 hr    2. Fibroids    3. Menorrhagia with regular cycle  -     sodium chloride 0.9 % flush 10 mL  -     sodium chloride 0.9 % flush 10 mL  -     sodium chloride 0.9 % infusion 40 mL  -     Case Request; Standing  -     ceFAZolin (ANCEF) 2,000 mg in sodium chloride 0.9 % 100 mL IVPB  -     acetaminophen (TYLENOL) tablet 1,000 mg  -     gabapentin (NEURONTIN) capsule 600 mg  -     scopolamine patch 1 mg/72 hr    4. Iron deficiency anemia due to chronic blood loss    Other orders  -     leuprolide (Lupron Depot, 1-Month,) 3.75 MG injection; Inject 3.75 mg into the appropriate muscle as directed by prescriber Every 28 (Twenty-Eight) Days.  Dispense: 1 each; Refill: 3  -     Code Status and Medical Interventions: CPR (Attempt to Resuscitate); Full Support; Standing  -     Follow Anesthesia Guidelines / Protocol; Future  -     Follow Anesthesia Guidelines / Protocol; Standing  -     Chlorhexidine Skin Prep; Future  -     Insert Peripheral IV; Standing  -     Saline Lock & Maintain IV Access; Standing  -     Place Sequential Compression Device; Standing  -     Maintain Sequential Compression Device; Standing            Counseling:  --Nutrition: Stressed importance of moderation and caloric balance, stressed fresh fruit and vegetables  --Exercise: Stressed the importance of regular exercise. 3-5 times weekly   - Discussed  screening mammogram recommendations.   --Discussed benefits of screening colonoscopy- age 45 unless FH  --Discussed pap smear screening recommendations

## 2025-06-09 ENCOUNTER — CLINICAL SUPPORT (OUTPATIENT)
Dept: OBSTETRICS AND GYNECOLOGY | Age: 45
End: 2025-06-09
Payer: COMMERCIAL

## 2025-06-09 DIAGNOSIS — D21.9 FIBROIDS: Primary | ICD-10-CM

## 2025-06-09 PROCEDURE — 96372 THER/PROPH/DIAG INJ SC/IM: CPT | Performed by: OBSTETRICS & GYNECOLOGY

## 2025-08-12 ENCOUNTER — TELEPHONE (OUTPATIENT)
Dept: OBSTETRICS AND GYNECOLOGY | Age: 45
End: 2025-08-12
Payer: COMMERCIAL

## (undated) DEVICE — STRAP STIRUP SLP RNG 19X3.5IN DISP

## (undated) DEVICE — SOL NACL 0.9PCT 1000ML

## (undated) DEVICE — GLV SURG BIOGEL LTX PF 7

## (undated) DEVICE — LOU D & C HYSTEROSCOPY: Brand: MEDLINE INDUSTRIES, INC.

## (undated) DEVICE — GLV SURG TRIUMPH CLASSIC PF LTX 6.5 STRL

## (undated) DEVICE — ENDOPATH XCEL BLUNT TIP TROCARS WITH SMOOTH SLEEVES: Brand: ENDOPATH XCEL

## (undated) DEVICE — GLV SURG TRIUMPH CLASSIC PF LTX 7 STRL

## (undated) DEVICE — ST IRR CYSTO W/SPK 77IN LF

## (undated) DEVICE — PAD SANI MAXI W/ADHS SNG WRP 11IN

## (undated) DEVICE — DRSNG WND BORDR/ADHS NONADHR/GZ LF 2X2IN STRL

## (undated) DEVICE — UNDYED BRAIDED (POLYGLACTIN 910), SYNTHETIC ABSORBABLE SUTURE: Brand: COATED VICRYL

## (undated) DEVICE — GLV SURG TRIUMPH CLASSIC PF LTX 7.5 STRL

## (undated) DEVICE — SKIN PREP TRAY W/CHG: Brand: MEDLINE INDUSTRIES, INC.

## (undated) DEVICE — ANTIBACTERIAL UNDYED BRAIDED (POLYGLACTIN 910), SYNTHETIC ABSORBABLE SUTURE: Brand: COATED VICRYL

## (undated) DEVICE — LOU GYN LAPAROSCOPY: Brand: MEDLINE INDUSTRIES, INC.